# Patient Record
Sex: FEMALE | Race: BLACK OR AFRICAN AMERICAN | Employment: FULL TIME | URBAN - METROPOLITAN AREA
[De-identification: names, ages, dates, MRNs, and addresses within clinical notes are randomized per-mention and may not be internally consistent; named-entity substitution may affect disease eponyms.]

---

## 2022-04-20 ENCOUNTER — OFFICE VISIT (OUTPATIENT)
Dept: PRIMARY CARE CLINIC | Age: 32
End: 2022-04-20
Payer: MEDICAID

## 2022-04-20 VITALS
TEMPERATURE: 97.4 F | OXYGEN SATURATION: 98 % | HEIGHT: 63 IN | WEIGHT: 293 LBS | RESPIRATION RATE: 20 BRPM | BODY MASS INDEX: 51.91 KG/M2

## 2022-04-20 DIAGNOSIS — I10 PRIMARY HYPERTENSION: Primary | ICD-10-CM

## 2022-04-20 DIAGNOSIS — N92.1 METRORRHAGIA: ICD-10-CM

## 2022-04-20 DIAGNOSIS — H91.93 BILATERAL HEARING LOSS, UNSPECIFIED HEARING LOSS TYPE: ICD-10-CM

## 2022-04-20 DIAGNOSIS — D21.9 FIBROIDS: ICD-10-CM

## 2022-04-20 DIAGNOSIS — F33.41 RECURRENT MAJOR DEPRESSIVE DISORDER, IN PARTIAL REMISSION (HCC): ICD-10-CM

## 2022-04-20 DIAGNOSIS — Z86.2 HISTORY OF ANEMIA: ICD-10-CM

## 2022-04-20 PROCEDURE — 99204 OFFICE O/P NEW MOD 45 MIN: CPT | Performed by: FAMILY MEDICINE

## 2022-04-20 RX ORDER — VALSARTAN 80 MG/1
80 TABLET ORAL DAILY
COMMUNITY
Start: 2022-03-20 | End: 2022-06-28 | Stop reason: SDUPTHER

## 2022-04-20 RX ORDER — SERTRALINE HYDROCHLORIDE 100 MG/1
100 TABLET, FILM COATED ORAL DAILY
COMMUNITY
Start: 2022-03-20 | End: 2022-04-20 | Stop reason: SDUPTHER

## 2022-04-20 RX ORDER — AMLODIPINE BESYLATE 5 MG/1
5 TABLET ORAL DAILY
COMMUNITY
Start: 2022-03-20 | End: 2022-06-22

## 2022-04-20 RX ORDER — SERTRALINE HYDROCHLORIDE 100 MG/1
150 TABLET, FILM COATED ORAL DAILY
Qty: 45 TABLET | Refills: 0 | Status: SHIPPED | OUTPATIENT
Start: 2022-04-20 | End: 2022-07-06 | Stop reason: ALTCHOICE

## 2022-04-20 NOTE — PROGRESS NOTES
HPI     Chief Complaint:   Chief Complaint   Patient presents with   [address]       Merced Monaco is an 33 y.o. female. Patient was previously receiving care at: Dr. Shama Forbes history significant for:   Patient Active Problem List   Diagnosis Code    HTN (hypertension) I10    Depression F32. A    Fibroids D21.9    History of anemia Z86.2    Metrorrhagia N92.1       Concerns for today's visit:    HTN: Compliant with meds. Denies chest pain, palpitations. She was previously on HCTZ but it caused increased urination, which was cumbersome given her job at that time so she now is on valsartan 80mg and amlodipine 5mg and tolerates well. Denies chest pain/palpitations. Depression: \"it's alright\". Sees Russell Huggins with [facility] for therapy. She does not feel zoloft is working as well as it was before. Had SI last year while grieving multiple family deaths. No HI/SI currently. She feels her mood is often flat. Hearing loss: unclear etiology, denies trauma. Patient wears hearing aid on the left. Anemia: she says she's been on iron intermittently over the years. Anemia fluctuates. She suffers with metrorrhagia. Social History - reviewed:  Social History     Tobacco Use    Smoking status: Never Smoker    Smokeless tobacco: Never Used   Vaping Use    Vaping Use: Never used   Substance Use Topics    Alcohol use: Not Currently    Drug use: Never       Family History - reviewed:  History reviewed. No pertinent family history. Current Outpatient Medications   Medication Sig    amLODIPine (NORVASC) 5 mg tablet Take 5 mg by mouth daily.  valsartan (DIOVAN) 80 mg tablet Take 80 mg by mouth daily.  sertraline (ZOLOFT) 100 mg tablet Take 1.5 Tablets by mouth daily.  hydrochlorothiazide (HYDRODIURIL) 25 mg tablet Take 25 mg by mouth daily. (Patient not taking: Reported on 4/20/2022)     No current facility-administered medications for this visit.        Allergies - reviewed:   No Known Allergies    Past Medical History - reviewed:  Past Medical History:   Diagnosis Date    Depression     Hypertension        Past Surgical History - reviewed:  History reviewed. No pertinent surgical history. Immunizations - reviewed:   Immunization History   Administered Date(s) Administered    COVID-19, Pfizer Purple top, DILUTE for use, 12+ yrs, 30mcg/0.3mL dose 09/20/2021, 10/11/2021       Review of Systems   Review of Systems   Constitutional: Negative for fever. HENT: Positive for hearing loss. Negative for ear pain. Respiratory: Negative for cough and shortness of breath. Cardiovascular: Negative for chest pain and palpitations. Endo/Heme/Allergies: Negative for polydipsia. Does not bruise/bleed easily. Psychiatric/Behavioral: Positive for depression. Negative for substance abuse. The patient does not have insomnia. Reviewed PmHx, FmHx, SocHx as well as meds and allergies, updated and dated in the chart. Objective     Visit Vitals  Temp 97.4 °F (36.3 °C) (Temporal)   Resp 20   Ht 5' 3\" (1.6 m)   Wt 314 lb 12.8 oz (142.8 kg)   SpO2 98%   BMI 55.76 kg/m²       Physical Exam  Vitals and nursing note reviewed. Constitutional:       General: She is not in acute distress. Appearance: She is obese. HENT:      Ears:      Comments: Left hearing aid in place. Eyes:      Extraocular Movements: Extraocular movements intact. Conjunctiva/sclera: Conjunctivae normal.   Cardiovascular:      Rate and Rhythm: Normal rate and regular rhythm. Pulmonary:      Effort: Pulmonary effort is normal. No respiratory distress. Breath sounds: Normal breath sounds. Neurological:      General: No focal deficit present. Mental Status: She is alert and oriented to person, place, and time. Assessment and Plan     Diagnoses and all orders for this visit:    1. Primary hypertension  Comments:  Continue current regimen.  Lab check at next visit, along with physical.    2. Recurrent major depressive disorder, in partial remission ([facility] [address]  Comments:  Increasing zoloft to 150mg daily. Continue therapy. Warning signs reviewed. Orders:  -     sertraline (ZOLOFT) 100 mg tablet; Take 1.5 Tablets by mouth daily. 3. Fibroids    4. Metrorrhagia    5. History of anemia  Comments:  Possibly 2/2 fibroids. Need outside records from [facility]. Lab and physical next visit. 6. Bilateral hearing loss, unspecified hearing loss type  Comments:  Does well with hearing aids. Follow-up and Dispositions    · Return in about 1 month (around 5/20/2022) for annual physical.  Routing History         I discussed the aforementioned diagnoses with the patient as well as the plan of care. All questions were answered and an AVS was provided.        Celestina Madison MD  [facility]  [place] [address] [place] [address], [address] [place] [address]

## 2022-04-20 NOTE — PROGRESS NOTES
1. \"Have you been to the ER, urgent care clinic since your last visit? Hospitalized since your last visit? \" No    2. \"Have you seen or consulted any other health care providers outside of the [address] [place] [address] since your last visit? \" No     3. For patients aged 39-70: Has the patient had a colonoscopy / FIT/ Cologuard? NA - based on age      If the patient is female:    4. For patients aged 41-77: Has the patient had a mammogram within the past 2 years? NA - based on age or sex      11. For patients aged 21-65: Has the patient had a pap smear?  No  Visit Vitals  Temp 97.4 °F (36.3 °C) (Temporal)   Resp 20   Ht 5' 3\" (1.6 m)   Wt 314 lb 12.8 oz (142.8 kg)   SpO2 98%   BMI 55.76 kg/m²     Chief Complaint   Patient presents with   [address]

## 2022-05-19 NOTE — PROGRESS NOTES
HPI     Chief Complaint   Patient presents with    Physical     Pap/Breast exam       Imelda Bass is a 33 y.o. female presenting for well woman exam and is also due for follow up care of chronic issues. Imelda Bass is willing to do both appointments today and realizes that there may be a co-pay for the follow-up portion of the visit. She has a history of:  Patient Active Problem List   Diagnosis Code    Essential hypertension I10    Depression F32. A    Fibroids D21.9    History of anemia Z86.2    Metrorrhagia N92.1    Dysmenorrhea N94.6    PMDD (premenstrual dysphoric disorder) F32.81       Occupation: Photography/Videography  Dentist: [employer]    Concerns today:    Gyn History    She gets a monthly menstrual cycle. Cycles are heavy with a lot of cramping and associated with mood changes. Has been diagnosed with PMDD previously and currently is on Zoloft for depression which usually works really well other than increased mood changes during the PMDD. She does not smoke and denies hx of DVT. Diet and Exercise  Is starting to workout again. Family History  No FH of breast cancer. No FH of ovarian cancer. No FH of colon cancer. Allergies- reviewed  No Known Allergies    Medications- reviewed  Current Outpatient Medications   Medication Sig    norgestimate-ethinyl estradioL (ORTHO-CYCLEN, SPRINTEC) 0.25-35 mg-mcg tab Take 1 Tablet by mouth daily.  amLODIPine (NORVASC) 5 mg tablet Take 5 mg by mouth daily.  valsartan (DIOVAN) 80 mg tablet Take 80 mg by mouth daily.  sertraline (ZOLOFT) 100 mg tablet Take 1.5 Tablets by mouth daily. No current facility-administered medications for this visit. Immunizations - reviewed:   Immunization History   Administered Date(s) Administered    COVID-19, Pfizer Purple top, DILUTE for use, 12+ yrs, 30mcg/0.3mL dose 2021, 10/11/2021     Flu: recommended every fall.      Review of Systems   Review of Systems   Constitutional: Negative for chills and fever. HENT: Negative for congestion and ear pain. Eyes: Negative for discharge and redness. Respiratory: Negative for cough and shortness of breath. Cardiovascular: Negative for chest pain and palpitations. Gastrointestinal: Negative for abdominal pain and blood in stool. Musculoskeletal: Negative for falls and myalgias. Skin: Negative for itching and rash. Neurological: Negative for focal weakness and headaches. Endo/Heme/Allergies: Negative for polydipsia. Does not bruise/bleed easily. Psychiatric/Behavioral: Positive for depression. Negative for hallucinations, substance abuse and suicidal ideas. The patient does not have insomnia. Reviewed PmHx, FmHx, SocHx as well as meds and allergies, updated and dated in the chart. Social History     Tobacco Use    Smoking status: Never Smoker    Smokeless tobacco: Never Used   Vaping Use    Vaping Use: Never used   Substance Use Topics    Alcohol use: Not Currently    Drug use: Never     Past Medical History:   Diagnosis Date    Depression     Hypertension      History reviewed. No pertinent family history. Objective     Visit Vitals  /80 (BP 1 Location: Left upper arm, BP Patient Position: Sitting, BP Cuff Size: Large adult)   Pulse 74   Temp 97.4 °F (36.3 °C) (Temporal)   Resp 20   Ht 5' 3\" (1.6 m)   Wt 315 lb 6.4 oz (143.1 kg)   LMP 05/09/2022   SpO2 98%   BMI 55.87 kg/m²       Physical Exam  Vitals reviewed. Constitutional:       General: She is not in acute distress. Appearance: Normal appearance. HENT:      Head: Normocephalic and atraumatic. Right Ear: Tympanic membrane and external ear normal.      Left Ear: Tympanic membrane and external ear normal.      Nose: Nose normal. No rhinorrhea. Mouth/Throat:      Mouth: Mucous membranes are moist.      Pharynx: Oropharynx is clear. No oropharyngeal exudate. Eyes:      Extraocular Movements: Extraocular movements intact. Conjunctiva/sclera: Conjunctivae normal.      Pupils: Pupils are equal, round, and reactive to light. Cardiovascular:      Rate and Rhythm: Normal rate and regular rhythm. Heart sounds: No murmur heard. Pulmonary:      Effort: Pulmonary effort is normal. No respiratory distress. Breath sounds: Normal breath sounds. No rales. Abdominal:      General: Abdomen is flat. There is no distension. Palpations: Abdomen is soft. Genitourinary:     Labia:         Right: No rash. Left: No rash. Musculoskeletal:         General: No swelling or deformity. Normal range of motion. Cervical back: Normal range of motion and neck supple. Skin:     General: Skin is warm. Capillary Refill: Capillary refill takes less than 2 seconds. Findings: No rash. Neurological:      General: No focal deficit present. Mental Status: She is alert and oriented to person, place, and time. Mental status is at baseline. Psychiatric:         Mood and Affect: Mood normal.         Behavior: Behavior normal.       Exam chaperoned by Rosalind Saenz LPN  Breast -breasts appear normal, no suspicious masses, no skin or nipple changes or axillary nodes . Pelvic - Unable to complete due to significant discomfort, unable to insert speculum fully and patient on menstrual cycle. External genitalia normal without rashes or lesions. Pink and moist vaginal mucosa with menstrual blood visualized. Assessment and Plan     Diagnoses and all orders for this visit:    1. Well woman exam with routine gynecological exam  Comments:  Age-appropriate guidance given and HM updated. Orders:  -     CBC W/O DIFF  -     METABOLIC PANEL, COMPREHENSIVE  -     TSH RFX ON ABNORMAL TO FREE T4  -     LIPID PANEL  -     OBTAINING SCREEN PAP SMEAR  -     PAP IG, APTIMA HPV AND RFX 16/18,45 ([id number])  -     HEPATITIS C AB  -     norgestimate-ethinyl estradioL (ORTHO-CYCLEN, SPRINTEC) 0.25-35 mg-mcg tab;  Take 1 Tablet by mouth daily.    2. Recurrent major depressive disorder, in partial remission ([facility] [address]  Comments:  Continue zoloft. 3. PMDD (premenstrual dysphoric disorder)  Comments: Will do trial of OCPs and follow up regarding mood within 6 weeks. 4. Morbid obesity with BMI of 50.0-59.9, adult [facility])  Comments:  Referring to dietician. Encouraged moderate intensity exercise 30 minutes a day several times a week. Orders:  -     CBC W/O DIFF  -     METABOLIC PANEL, COMPREHENSIVE  -     TSH RFX ON ABNORMAL TO FREE T4  -     REFERRAL TO DIETITIAN    5. Fibroids  Comments:  Referred to Dr. Tiburcio Mojica for follow up. Orders:  -     REFERRAL TO OBSTETRICS AND GYNECOLOGY    6. Dysmenorrhea  -     REFERRAL TO OBSTETRICS AND GYNECOLOGY    7. Essential hypertension  Comments: At goal. Continue current regimen. 8. Need for hepatitis C screening test  -     HEPATITIS C AB    9. Attention deficit hyperactivity disorder (ADHD), unspecified ADHD type  Comments: We discussed straterra. We will wait on adding multiple meds now. She will see how she does on OCPs with mood. Could switch to wellbutrin for ADHD & Deppresion. I discussed the aforementioned diagnoses with the patient as well as the plan of care. All questions were answered and an AVS was provided.        Princess Lucas MD  [address] [place] [address]

## 2022-05-20 ENCOUNTER — OFFICE VISIT (OUTPATIENT)
Dept: PRIMARY CARE CLINIC | Age: 32
End: 2022-05-20
Payer: COMMERCIAL

## 2022-05-20 VITALS
DIASTOLIC BLOOD PRESSURE: 80 MMHG | OXYGEN SATURATION: 98 % | TEMPERATURE: 97.4 F | SYSTOLIC BLOOD PRESSURE: 130 MMHG | RESPIRATION RATE: 20 BRPM | HEART RATE: 74 BPM | BODY MASS INDEX: 51.91 KG/M2 | WEIGHT: 293 LBS | HEIGHT: 63 IN

## 2022-05-20 DIAGNOSIS — F90.9 ATTENTION DEFICIT HYPERACTIVITY DISORDER (ADHD), UNSPECIFIED ADHD TYPE: ICD-10-CM

## 2022-05-20 DIAGNOSIS — Z11.59 NEED FOR HEPATITIS C SCREENING TEST: ICD-10-CM

## 2022-05-20 DIAGNOSIS — Z01.419 WELL WOMAN EXAM WITH ROUTINE GYNECOLOGICAL EXAM: Primary | ICD-10-CM

## 2022-05-20 DIAGNOSIS — E66.01 MORBID OBESITY WITH BMI OF 50.0-59.9, ADULT (HCC): ICD-10-CM

## 2022-05-20 DIAGNOSIS — F32.81 PMDD (PREMENSTRUAL DYSPHORIC DISORDER): ICD-10-CM

## 2022-05-20 DIAGNOSIS — F33.41 RECURRENT MAJOR DEPRESSIVE DISORDER, IN PARTIAL REMISSION (HCC): ICD-10-CM

## 2022-05-20 DIAGNOSIS — N94.6 DYSMENORRHEA: ICD-10-CM

## 2022-05-20 DIAGNOSIS — D21.9 FIBROIDS: ICD-10-CM

## 2022-05-20 DIAGNOSIS — I10 ESSENTIAL HYPERTENSION: ICD-10-CM

## 2022-05-20 PROCEDURE — 99395 PREV VISIT EST AGE 18-39: CPT | Performed by: FAMILY MEDICINE

## 2022-05-20 PROCEDURE — 99214 OFFICE O/P EST MOD 30 MIN: CPT | Performed by: FAMILY MEDICINE

## 2022-05-20 RX ORDER — NORGESTIMATE AND ETHINYL ESTRADIOL 0.25-0.035
1 KIT ORAL DAILY
Qty: 1 DOSE PACK | Refills: 11 | Status: SHIPPED | OUTPATIENT
Start: 2022-05-20 | End: 2022-07-25 | Stop reason: SDUPTHER

## 2022-05-20 NOTE — PATIENT INSTRUCTIONS
WEIGHT LOSS TIPS  1. Read food labels and count calories. To lose a pound in a week, you need to decrease your daily calorie intake by about 500 calories - that's one less dessert or sweet beverage and a bag of chips every day. Some helpful online tools are:  Www.loseit. com   Www.eSharesfitnesspal.com   Weight watchers mobile      2. Increase water intake. Water keeps your hydrated and is important for your bodily functions. It also has zero calories. It is healthier for you than a sweet tea or soda. 3. Decrease sugary beverages (soda, energy drinks, fruit juices). Each can or glass of soda increases your risk of obesity because it adds empty calories. Cutting out sodas and other sugary beverages such as sweetened juices, is an easy way to eliminate unnecessary calories in your diet. 12 oz can of soda = 140 calories, 16 oz cup of sweet tea = 200 calories   16 oz orange juice = 200 calories, 10 oz apple juice = 150 calories   32 oz sports drink = 200 calories, 16 oz punch = 240 calories   3.5 oz alcohol = 100-150 calories      4. [employer] your own meals. Avoid fast food. These types of foods are dense in calories. 5. Exercise 30 minutes daily 5 days weekly, minimally. If you burn 3500 calories a week that equals a pound! Use a pedometer to count steps. Visit www. [employer] for a free pedometer and diet recommendations. 6. Decrease carbohydrates (white bread, pasta, rice, potatoes, sweet foods and sweet drinks like soda, tea, coffee, juice and sports drinks). Increase fiber and lean protein. 7. Eat 3-5 small meals daily. (Breakfast, lunch, dinner with 2 healthy snacks). 8. Get proper rest 7-8 hours uninterrupted. When you get less than 6 hours, it triggers hunger by affecting your Grehlin:Leptin ratio and this results in weight gain. 9. Watch your food portions. Green leafy vegetables should cover 1/2 of the plate, lean meat 1/4 of the plate, starchy vegetable 1/4 of the plate.  Use smaller plates! 10. Do not eat until you are full. Eat until you are no longer hungry. If you are not sure, try drinking a glass of water before getting your second serving of food. 11. Weigh yourself often. Don't worry about mild fluctuation (5 lbs or so) but keep an eye on general trends. Weighing yourself at least a few times a week helps you remain accountable and aware. 12. Set realistic, appropriate and achievable weight loss goals: You didn't gain it overnight, it's going to take time to lose it. Slow progress is still progress! RECOMMENDED TARGET WEIGHT LOSS: Initial weight loss of 5-10% of your initial body weight achieved over 6 months or a decrease of 2 BMI units. MINIMUM GOAL OF WEIGHT LOSS: Reduce body weight and maintain a lower body weight. Prevent weight gain.

## 2022-05-20 NOTE — PROGRESS NOTES
1. \"Have you been to the ER, urgent care clinic since your last visit? Hospitalized since your last visit? \" No    2. \"Have you seen or consulted any other health care providers outside of the [address] [place] [address] since your last visit? \" No     3. For patients aged 39-70: Has the patient had a colonoscopy / FIT/ Cologuard? NA - based on age      If the patient is female:    4. For patients aged 41-77: Has the patient had a mammogram within the past 2 years? NA - based on age or sex      11. For patients aged 21-65: Has the patient had a pap smear?  No  Visit Vitals  /80 (BP 1 Location: Left upper arm, BP Patient Position: Sitting, BP Cuff Size: Large adult)   Pulse 74   Temp 97.4 °F (36.3 °C) (Temporal)   Resp 20   Ht 5' 3\" (1.6 m)   Wt 315 lb 6.4 oz (143.1 kg)   LMP 05/09/2022   SpO2 98%   BMI 55.87 kg/m²     Chief Complaint   Patient presents with    Physical     Pap/Breast exam

## 2022-05-21 LAB
ALBUMIN SERPL-MCNC: 4.3 G/DL (ref 3.8–4.8)
ALBUMIN/GLOB SERPL: 1.5 {RATIO} (ref 1.2–2.2)
ALP SERPL-CCNC: 91 IU/L (ref 44–121)
ALT SERPL-CCNC: 20 IU/L (ref 0–32)
AST SERPL-CCNC: 20 IU/L (ref 0–40)
BILIRUB SERPL-MCNC: <0.2 MG/DL (ref 0–1.2)
BUN SERPL-MCNC: 14 MG/DL (ref 6–20)
BUN/CREAT SERPL: 17 (ref 9–23)
CALCIUM SERPL-MCNC: 10.3 MG/DL (ref 8.7–10.2)
CHLORIDE SERPL-SCNC: 99 MMOL/L (ref 96–106)
CHOLEST SERPL-MCNC: 183 MG/DL (ref 100–199)
CO2 SERPL-SCNC: 24 MMOL/L (ref 20–29)
CREAT SERPL-MCNC: 0.82 MG/DL (ref 0.57–1)
EGFR: 97 ML/MIN/1.73
ERYTHROCYTE [DISTWIDTH] IN BLOOD BY AUTOMATED COUNT: 14.9 % (ref 11.7–15.4)
GLOBULIN SER CALC-MCNC: 2.9 G/DL (ref 1.5–4.5)
GLUCOSE SERPL-MCNC: 95 MG/DL (ref 65–99)
HCT VFR BLD AUTO: 37.1 % (ref 34–46.6)
HCV AB S/CO SERPL IA: <0.1 S/CO RATIO (ref 0–0.9)
HDLC SERPL-MCNC: 55 MG/DL
HGB BLD-MCNC: 11.3 G/DL (ref 11.1–15.9)
LDLC SERPL CALC-MCNC: 109 MG/DL (ref 0–99)
MCH RBC QN AUTO: 24.2 PG (ref 26.6–33)
MCHC RBC AUTO-ENTMCNC: 30.5 G/DL (ref 31.5–35.7)
MCV RBC AUTO: 80 FL (ref 79–97)
PLATELET # BLD AUTO: 394 X10E3/UL (ref 150–450)
POTASSIUM SERPL-SCNC: 4 MMOL/L (ref 3.5–5.2)
PROT SERPL-MCNC: 7.2 G/DL (ref 6–8.5)
RBC # BLD AUTO: 4.66 X10E6/UL (ref 3.77–5.28)
SODIUM SERPL-SCNC: 138 MMOL/L (ref 134–144)
TRIGL SERPL-MCNC: 106 MG/DL (ref 0–149)
TSH SERPL DL<=0.005 MIU/L-ACNC: 4.43 UIU/ML (ref 0.45–4.5)
VLDLC SERPL CALC-MCNC: 19 MG/DL (ref 5–40)
WBC # BLD AUTO: 9.1 X10E3/UL (ref 3.4–10.8)

## 2022-05-24 NOTE — PROGRESS NOTES
[employer] message sent:  Good morning Brittney,    Your labs have returned. Your blood counts, kidney function, liver function, electrolytes, and thyroid function are within acceptable limits. Your LDL, the \"bad cholesterol\", is slightly elevated. Let's work on healthier lifestyle, including cutting back on red meat, pork, fried/processed foods. I also recommend 30 minutes of moderate intensity exercise most days of the week. Your hepatitis C screening was negative. Please do not hesitate to reach out with any questions. Thank you for the privilege to participate in your healthcare.     Dr. BEASLEY [facility]

## 2022-05-25 DIAGNOSIS — Z76.89 ENCOUNTER FOR WEIGHT MANAGEMENT: Primary | ICD-10-CM

## 2022-05-25 DIAGNOSIS — E66.01 MORBID OBESITY (HCC): ICD-10-CM

## 2022-06-07 ENCOUNTER — OFFICE VISIT (OUTPATIENT)
Dept: SURGERY | Age: 32
End: 2022-06-07
Payer: MEDICAID

## 2022-06-07 VITALS
HEIGHT: 63 IN | HEART RATE: 81 BPM | BODY MASS INDEX: 51.91 KG/M2 | DIASTOLIC BLOOD PRESSURE: 86 MMHG | WEIGHT: 293 LBS | SYSTOLIC BLOOD PRESSURE: 152 MMHG | RESPIRATION RATE: 20 BRPM | OXYGEN SATURATION: 98 %

## 2022-06-07 DIAGNOSIS — G47.8 UNREFRESHED BY SLEEP: ICD-10-CM

## 2022-06-07 DIAGNOSIS — R40.0 DAYTIME SOMNOLENCE: ICD-10-CM

## 2022-06-07 DIAGNOSIS — R06.83 SNORING: ICD-10-CM

## 2022-06-07 DIAGNOSIS — Z13.1 SCREENING FOR DIABETES MELLITUS: ICD-10-CM

## 2022-06-07 DIAGNOSIS — R51.9 FREQUENT HEADACHES: ICD-10-CM

## 2022-06-07 DIAGNOSIS — Z13.6 SCREENING FOR ISCHEMIC HEART DISEASE: ICD-10-CM

## 2022-06-07 DIAGNOSIS — E66.01 OBESITY, MORBID, BMI 50 OR HIGHER (HCC): Primary | ICD-10-CM

## 2022-06-07 DIAGNOSIS — I10 ESSENTIAL HYPERTENSION: ICD-10-CM

## 2022-06-07 PROCEDURE — 99214 OFFICE O/P EST MOD 30 MIN: CPT | Performed by: FAMILY MEDICINE

## 2022-06-07 PROCEDURE — 93000 ELECTROCARDIOGRAM COMPLETE: CPT | Performed by: FAMILY MEDICINE

## 2022-06-07 RX ORDER — CHOLECALCIFEROL TAB 125 MCG (5000 UNIT) 125 MCG
5000 TAB ORAL DAILY
COMMUNITY

## 2022-06-07 RX ORDER — MENTHOL
1000 GEL (GRAM) TOPICAL DAILY
COMMUNITY
End: 2022-07-25

## 2022-06-07 RX ORDER — GLUCOSAMINE SULFATE 500 MG
1 TABLET ORAL DAILY
COMMUNITY

## 2022-06-07 RX ORDER — LANOLIN ALCOHOL/MO/W.PET/CERES
1000 CREAM (GRAM) TOPICAL DAILY
COMMUNITY

## 2022-06-07 RX ORDER — LANOLIN ALCOHOL/MO/W.PET/CERES
400 CREAM (GRAM) TOPICAL DAILY
COMMUNITY

## 2022-06-07 RX ORDER — LANOLIN ALCOHOL/MO/W.PET/CERES
325 CREAM (GRAM) TOPICAL
COMMUNITY

## 2022-06-07 NOTE — PROGRESS NOTES
1. Have you been to the ER, urgent care clinic since your last visit? Hospitalized since your last visit? No    2. Have you seen or consulted any other health care providers outside of the [address] [place] [address] since your last visit? Include any pap smears or colon screening. No     Pt blood pressure is high. Pt indicates she took her BP at 11am today. Pt denies chest pain, arm pain, blurry vision, dizziness, weakness and swelling legs. Writer has reported pt's blood pressure to Dr Paul Maya.

## 2022-06-07 NOTE — PROGRESS NOTES
[facility] Progress Note: Initial Physician Visit     Richa Teran is a 33 y.o. female who is here for medical screening for entering the [facility].   The patient denies any disease state that requires protein restriction. CC: class 3  Obesity    Referred by Dr Jourdan Lagunas reason referred  Blood pressure, overweight her whole life    Weight History  I personally reviewed the Medical Screening Lutricia Carrizozo completed by patient and scanned into media section of chart. It includes duration of their obesity, maximum weight, goal weight  all of which give the context of their obesity AND a Family History of their obesity. Weight loss History  I personally reviewed the Medical Screening Lutricia Carrizozo completed by the patient and scanned into media section of chart. It includes number of weight loss attempts, the weight loss program that patients was most successful using, and if they have any hx of anorectic medication use, including OTC supplements. Significant Medical History  Past Medical History:   Diagnosis Date    Depression     Hypertension      Patient's medicactions that may contribute: zoloft  Plan to become pregant within 6 months     I personally reviewed the Medical Screening Lutricia Carrizozo completed by the patient and scanned into media section of chart. This allows me to assess associated symptoms that are significant in the assessment of the patient's obesity and the patient's Past Medical History. Outpatient Medications Marked as Taking for the 6/7/22 encounter (Office Visit) with Liana Brooks MD   Medication Sig Dispense Refill    cholecalciferol (Vitamin D3) (5000 Units/125 mcg) tab tablet Take  by mouth daily.  cyanocobalamin (Vitamin B-12) 1,000 mcg tablet Take 1,000 mcg by mouth daily.  magnesium oxide (MAG-OX) 400 mg tablet Take 400 mg by mouth daily.  Omega-3 Fatty Acids 60- mg cpDR Take  by mouth.       zinc 50 mg tab tablet Take  by mouth daily.  vitamin e (E GEMS) 1,000 unit capsule Take 1,000 Units by mouth daily.  ferrous sulfate (Iron) 325 mg (65 mg iron) tablet Take  by mouth Daily (before breakfast).  lysine 1,000 mg tab Take  by mouth.  norgestimate-ethinyl estradioL (ORTHO-CYCLEN, SPRINTEC) 0.25-35 mg-mcg tab Take 1 Tablet by mouth daily. 1 Dose Pack 11    amLODIPine (NORVASC) 5 mg tablet Take 5 mg by mouth daily.  valsartan (DIOVAN) 80 mg tablet Take 80 mg by mouth daily.  sertraline (ZOLOFT) 100 mg tablet Take 1.5 Tablets by mouth daily. 45 Tablet 0       AVG Hours SLEEP: poor quality    ISABEL snoring, wakes wit headaches, unrefreshed by sleep,  Daytime somnolence   CPAP no    Significant Psychosocial History   Has a doctor every diagnosed with Binge Eating Disorder, Bulemia or Anorexia? : no     Compliance  Upcoming Travel? no    Social History  Social History     Tobacco Use    Smoking status: Never Smoker    Smokeless tobacco: Never Used   Substance Use Topics    Alcohol use: Not Currently       Exercise  I personally reviewed the Medical Screening Chasidy Chawla completed by the patient and scanned into media section of chart.   MINUTES 60 and  2-3 times a week the last 3 weeks  Started  Kick boxing class a few months ago but has not been consistent    Review of Systems  See HPI        Objective  Visit Vitals  BP (!) 152/86 (BP 1 Location: Left arm, BP Patient Position: Sitting, BP Cuff Size: Thigh)   Pulse 81   Resp 20   Ht 5' 3\" (1.6 m)   Wt 311 lb 3.2 oz (141.2 kg)   LMP 05/09/2022   SpO2 98%   BMI 55.13 kg/m²         Weight Metrics 6/13/2022 6/7/2022 6/7/2022 5/20/2022 4/20/2022 4/25/2012   Weight 304 lb 1.6 oz - 311 lb 3.2 oz 315 lb 6.4 oz 314 lb 12.8 oz 275 lb   Neck Circ (inches) - 17.5 - - - -   Waist Measure Inches - 54.5 - - - -   Body Fat % - 49.6 - - - -   BMI 53.87 kg/m2 - 55.13 kg/m2 55.87 kg/m2 55.76 kg/m2 48.73 kg/m2       Labs: See  labs scanned into Media section or in lab section of record      Physical Exam    Vital Signs Reviewed  Weight Management Metrics Reviewed    Appearance: well  HEENT:  Scleral icterus?  no  Neck:  Thyromegaly or nodules? no  Mouth: Large tongue no  Heart:  RRR  Lungs:  clear  Abdomen:     Hepatomegaly? no   Striae present? no  Skin:    Acne?  no   Hirsutism? no   Skin tags? yes   Acanthosis Nigricans? yes  Ext:  Edema? no      Assessment & Plan  Encounter Diagnoses   Name Primary?  Obesity, morbid, BMI 50 or higher ([address] Yes    Snoring     Unrefreshed by sleep     Frequent headaches     Daytime somnolence     Screening for ischemic heart disease     Screening for diabetes mellitus        1. labs reviewed w/ patient  2. EKG reviewed w/ patient:   Personally reviewed by me, NSR, No abnormalities,    QTc = 412 sec (Upper limit is 440 for males & 460 for females)  Nonspecific t wave changes  Because she is so young and tolerating kick boxing with no chest pain  these changes are likely not a true problem  3. Medication changes include: none  Diagnoses and all orders for this visit:    1. Obesity, morbid, BMI 50 or higher ([facility] [address]  -     AMB POC EKG ROUTINE W/ 12 LEADS, INTER & REP  She opts for the LCD plan  She will start right away   make appt w dietitian for asap    2. Snoring  -     SLEEP MEDICINE REFERRAL  Has signs of ISABEL. The elevated BP may be due to this   Get sleep study asap  3. Essential hypertension  Cont the same meds, amlodipine 5 mg every day and valsartan 80 mg qd  No change in dose   Hoping to eventually get off of both  4. Unrefreshed by sleep  -     SLEEP MEDICINE REFERRAL    5. Frequent headaches  -     SLEEP MEDICINE REFERRAL    5. Daytime somnolence  -     SLEEP MEDICINE REFERRAL    6. Screening for ischemic heart disease  -     AMB POC EKG ROUTINE W/ 12 LEADS, INTER & REP    7. Screening for diabetes mellitus  -     HEMOGLOBIN A1C WITH EAG;  Future        Based on his history, labs and EKG, Amanda Gutierrez is a good candidate for the [facility]      time with Silvana Vides consisted of counseling & coordinating and/or discussing treatment plans in reference to her obesity The primary encounter diagnosis was Obesity, morbid, BMI 50 or higher ([facility] [address] Diagnoses of Snoring, Unrefreshed by sleep, Frequent headaches, Daytime somnolence, Screening for ischemic heart disease, and Screening for diabetes mellitus were also pertinent to this visit. denies pain/discomfort

## 2022-06-08 ENCOUNTER — TELEPHONE (OUTPATIENT)
Dept: SLEEP MEDICINE | Age: 32
End: 2022-06-08

## 2022-06-08 LAB
EST. AVERAGE GLUCOSE BLD GHB EST-MCNC: 103 MG/DL
HBA1C MFR BLD: 5.2 % (ref 4.8–5.6)

## 2022-06-08 NOTE — TELEPHONE ENCOUNTER
LEXA on 06/09/2022 at 9:03am returning patient's call into the office to schedule a New patient consult visit with Dr. Ne Adams.

## 2022-06-09 ENCOUNTER — CLINICAL SUPPORT (OUTPATIENT)
Dept: SURGERY | Age: 32
End: 2022-06-09

## 2022-06-09 DIAGNOSIS — E66.01 OBESITY, MORBID, BMI 50 OR HIGHER (HCC): Primary | ICD-10-CM

## 2022-06-09 NOTE — PROGRESS NOTES
[facility]  Metabolic Program Initial Nutrition Consult    Date: 2022   Physician: Karina Mares MD  Name: Tejas Broussard  :  1990    Type of Plan: VLCD  Weeks on Plan: day 2  Virtual visit was completed through [address] [place] [address]. ASSESSMENT:    Medications/Supplements:   Prior to Admission medications    Medication Sig Start Date End Date Taking? Authorizing Provider   cholecalciferol (Vitamin D3) (5000 Units/125 mcg) tab tablet Take  by mouth daily. Provider, Historical   cyanocobalamin (Vitamin B-12) 1,000 mcg tablet Take 1,000 mcg by mouth daily. Provider, Historical   magnesium oxide (MAG-OX) 400 mg tablet Take 400 mg by mouth daily. Provider, Historical   Omega-3 Fatty Acids 60- mg cpDR Take  by mouth. Provider, Historical   zinc 50 mg tab tablet Take  by mouth daily. Provider, Historical   vitamin e (E GEMS) 1,000 unit capsule Take 1,000 Units by mouth daily. Provider, Historical   ferrous sulfate (Iron) 325 mg (65 mg iron) tablet Take  by mouth Daily (before breakfast). Provider, Historical   lysine 1,000 mg tab Take  by mouth. Provider, Historical   norgestimate-ethinyl estradioL (ORTHO-CYCLEN, SPRINTEC) 0.25-35 mg-mcg tab Take 1 Tablet by mouth daily. 22   Roberto Carlos Leonardo MD   amLODIPine (NORVASC) 5 mg tablet Take 5 mg by mouth daily. 3/20/22   Provider, Historical   valsartan (DIOVAN) 80 mg tablet Take 80 mg by mouth daily. 3/20/22   Provider, Historical   sertraline (ZOLOFT) 100 mg tablet Take 1.5 Tablets by mouth daily. 22   Roberto Carlos Leonardo MD       Anthropometrics:    Ht:63\"   Wt: 311#    IBW: 115#    %IBW: 270%    BMI:55    Category: Obesity Class 3    Pt presents today for initial nutrition consult for the Reina Reyes.      Exercise/Physical Activity: gym but none this week. Walking dog daily.     Started meal replacements:yes  If yes, how many per day:3    Aversions/side effects to meal replacements: none    Reported Diet History:  2 ND shakes, 1 ND soup, and 1 bar. On menstrual cycle during this visit. Has had food outside meal plan because of this including cupcakes. Gingerale for nausea. Beverages: water up to 64 ounces but working on increasing, likes coffee but none since starting program.    Environment/Psychosocial/Support: admits to boredom eating. NUTRITION INTERVENTION:  Pt educated on nutrition recommendations for VLCD, specifically 4 ND products per day totaling 800 kcals, 40-50 grams carbs, 100+ grams protein, and remaining calories as healthy fats. Grocery meal:  Use the balanced plate method to plan meals, include 3-6 oz of lean source of protein, unlimited non-starchy vegetables, 1/2 cup whole grains/beans OR 1/2 cup fruit OR 1 serving of low fat dairy. Utilize handouts listing healthy snack and meal ideas. Read all nutrition labels. Demonstrated and emphasized identifying serving size, total fat, sugar and protein content. Defined low fat as </= 3 g per serving. Discussed lean and extra lean sources of protein. Provided list of low fat cooking methods. Avoid foods with sugar listed in the first 3 ingredients and >/10 g sugar per serving. Practice mindful eating habits; take small bites, chew thoroughly, avoid distractions, utilize hunger/fullness scale. Attend Metabolic Weight Loss Class and Support Group and increase physical activity (approved per MD) for long term weight maintenance. NUTRITION MONITORING AND EVALUATION: Reviewed VLCD manual, best tips, and safe use. Pt motivated, amicable to suggestions. Followup one month. The following goals were established with patient;  1) have 5 ND products on gym days  2) water  ounces water per day      Specific tips and techniques to facilitate compliance with above recommendations were provided and discussed. If further details are desired please contact me at [phone].   This phone number was also provided to the patient for any further questions or concerns.           Patricia Hess, MS, RD, LDN

## 2022-06-13 ENCOUNTER — TELEPHONE (OUTPATIENT)
Dept: PRIMARY CARE CLINIC | Age: 32
End: 2022-06-13

## 2022-06-13 ENCOUNTER — CLINICAL SUPPORT (OUTPATIENT)
Dept: SURGERY | Age: 32
End: 2022-06-13

## 2022-06-13 VITALS
WEIGHT: 293 LBS | OXYGEN SATURATION: 98 % | SYSTOLIC BLOOD PRESSURE: 117 MMHG | HEIGHT: 63 IN | TEMPERATURE: 98.2 F | HEART RATE: 74 BPM | BODY MASS INDEX: 51.91 KG/M2 | DIASTOLIC BLOOD PRESSURE: 81 MMHG | RESPIRATION RATE: 18 BRPM

## 2022-06-13 DIAGNOSIS — I10 ESSENTIAL HYPERTENSION: ICD-10-CM

## 2022-06-13 DIAGNOSIS — E66.01 OBESITY, MORBID, BMI 50 OR HIGHER (HCC): Primary | ICD-10-CM

## 2022-06-13 NOTE — PROGRESS NOTES
Weight Management. 1. Have you been to the ER, urgent care clinic since your last visit? Hospitalized since your last visit? No    2. Have you seen or consulted any other health care providers outside of the [address] [place] [address] since your last visit? Include any pap smears or colon screening. No       Progress Note: Weekly Education Class in the [facility]         Patient is on Very Low Calorie Diet [x] (4 meal replacements per day, 800 kcal/day)      Low Calorie Diet [] (2-3 meal replacements per day, 1239-1040 kcal/day)    1) Did patient have any new symptoms or physical problems? Yes [x]    No []    If yes, check & comment: weakness [x], fatigue [x], lightheadedness [x], headache [], cramps [x], cold intolerance [], hair loss [], diarrhea [x], constipation [],  NA [] other: Heavy period with  Non-stop cramps                                2) Has patient had any medical attention from other providers, urgent care or the emergency room this week? Yes []  No [x]       NA [], If yes, why:                                      3) Any other sugar sweetened beverages consumed this week? Yes [x]  No []    4) Did patient have any problems adhering to the diet? Yes [x]  No [] NA []    If yes, Vacation [], Celebrations [], Conferences [], Family Reunions [] other: Pain - I needed something   solid for thismedicine                                               5) How many hours of sleep this week? 4.5-7.5    (range)  NA []    Number of meal replacements consumed daily? 3-4  (range)  NA []    Average ounces of water patient consumed daily this week (not including shakes)? 37     (divide the weekly total by 7)    Did you eat any food outside of the program? Yes [x] No []    Physical Activity Over the Past Week:    Cardio exercise: 56 min  Strength exercise:1  workouts / week  Number of steps walked per day: 6201-6815    How has patient mood overall been this week?  Sad [], Happy [], Stressed [], Tired [], Content [], NA [], other   Several different moods             Medications reconciled by nurse Yes [x]  No[]    Patient was given therapeutic recommendations for any noted side effects of their dietary approach based upon [facility] patient manual per providers recommendation.

## 2022-06-13 NOTE — TELEPHONE ENCOUNTER
I started the weight management program this past week and I must say, this has been the worst period I've had ever. I guess my body is adjusting but I've been bleeding and cramping a lot. More than usual.      Could this be a combination of the birth control and diet change that's causing this? Ibuprofen is barely working. It's only been 5 days since I started but I'm usually done by now. It's just different. I've never cramped non-stop for days at a time like this.  :(

## 2022-06-22 ENCOUNTER — CLINICAL SUPPORT (OUTPATIENT)
Dept: SURGERY | Age: 32
End: 2022-06-22

## 2022-06-22 VITALS
RESPIRATION RATE: 18 BRPM | DIASTOLIC BLOOD PRESSURE: 82 MMHG | SYSTOLIC BLOOD PRESSURE: 146 MMHG | OXYGEN SATURATION: 99 % | TEMPERATURE: 99.8 F | BODY MASS INDEX: 51.91 KG/M2 | WEIGHT: 293 LBS | HEIGHT: 63 IN | HEART RATE: 78 BPM

## 2022-06-22 DIAGNOSIS — E66.01 OBESITY, MORBID, BMI 50 OR HIGHER (HCC): Primary | ICD-10-CM

## 2022-06-22 DIAGNOSIS — R06.83 SNORING: ICD-10-CM

## 2022-06-22 DIAGNOSIS — I10 ESSENTIAL HYPERTENSION: ICD-10-CM

## 2022-06-22 RX ORDER — AMLODIPINE BESYLATE 5 MG/1
TABLET ORAL
Qty: 90 TABLET | Refills: 1 | Status: SHIPPED | OUTPATIENT
Start: 2022-06-22

## 2022-06-22 NOTE — PROGRESS NOTES
1. Have you been to the ER, urgent care clinic since your last visit? Hospitalized since your last visit? No    2. Have you seen or consulted any other health care providers outside of the [address] [place] [address] since your last visit? Include any pap smears or colon screening. No     Pt states she has been out of her BP meds for a few days. Pt indicates she will send PCP a My Chart message to get a refill. Pt denies chest pain, arm pain, dizziness, weakness, blurry vision, headache, SOB and increased swelling.     06/19/2022  Progress Note: Weekly Education Class in the [facility] Weight Loss Program         Patient is on Very Low Calorie Diet [x] (4 meal replacements per day, 800 kcal/day)      Low Calorie Diet [] (2-3 meal replacements per day, 2750-8265 kcal/day)    1) Did patient have any new symptoms or physical problems? Yes [x]    No []    If yes, check & comment: weakness [x], fatigue [x], lightheadedness [], headache [x], cramps [x], cold intolerance [], hair loss [], diarrhea [], constipation [],  NA [] other:                 2) Has patient had any medical attention from other providers, urgent care or the emergency room this week? Yes []  No [x]       NA [], If yes, why:                    3) Any other sugar sweetened beverages consumed this week? Yes [x]  No []    4) Did patient have any problems adhering to the diet? Yes [x]  No [] NA []    If yes, Vacation [], Celebrations [], Conferences [], Family Reunions [] other:   Took too much medicine one day and got sick, I was very weak and tired after a very active day so I ate regular food                   5) How many hours of sleep this week? 5:30-8:30   (range)  NA []    Number of meal replacements consumed daily? 2-4 (range)  NA []    Average ounces of water patient consumed daily this week (not including shakes)?   97 (divide the weekly total by 7)    Did you eat any food outside of the program? Yes [x] No []    Physical Activity Over the Past Week:    Cardio exercise: n/a min  Strength exercise: n/a workouts / week  Number of steps walked per day: 5879-23264    How has patient mood overall been this week? Sad [], Happy [], Stressed [], Tired [], Content [], NA [], other   \"chill\"          Medications reconciled by nurse Yes [x]  No[]    Patient was given therapeutic recommendations for any noted side effects of their dietary approach based upon [facility] patient manual per providers recommendation.

## 2022-06-23 ENCOUNTER — OFFICE VISIT (OUTPATIENT)
Dept: SURGERY | Age: 32
End: 2022-06-23

## 2022-06-23 DIAGNOSIS — E66.01 OBESITY, MORBID, BMI 50 OR HIGHER (HCC): Primary | ICD-10-CM

## 2022-06-23 NOTE — PROGRESS NOTES
[facility]  Metabolic Weight Loss Program        Patient's Name: Amanda Gutierrez  : 1990    This patient is enrolled in [address] [place] [address] Weight Loss Program and attended the required weekly virtual nutrition class hosted via [address] [place] [address].       Lino Steel, MS, RD, LDN

## 2022-06-27 ENCOUNTER — OFFICE VISIT (OUTPATIENT)
Dept: OBGYN CLINIC | Age: 32
End: 2022-06-27
Payer: MEDICAID

## 2022-06-27 VITALS
HEIGHT: 63 IN | HEART RATE: 70 BPM | DIASTOLIC BLOOD PRESSURE: 82 MMHG | OXYGEN SATURATION: 97 % | SYSTOLIC BLOOD PRESSURE: 166 MMHG | BODY MASS INDEX: 51.91 KG/M2 | RESPIRATION RATE: 20 BRPM | TEMPERATURE: 96.9 F | WEIGHT: 293 LBS

## 2022-06-27 DIAGNOSIS — Z86.018 HISTORY OF UTERINE FIBROID: ICD-10-CM

## 2022-06-27 DIAGNOSIS — N92.0 MENORRHAGIA WITH REGULAR CYCLE: ICD-10-CM

## 2022-06-27 DIAGNOSIS — Z01.419 ENCOUNTER FOR GYNECOLOGICAL EXAMINATION WITHOUT ABNORMAL FINDING: Primary | ICD-10-CM

## 2022-06-27 DIAGNOSIS — Z12.39 ENCOUNTER FOR BREAST CANCER SCREENING USING NON-MAMMOGRAM MODALITY: ICD-10-CM

## 2022-06-27 DIAGNOSIS — Z87.42 HISTORY OF OVARIAN CYST: ICD-10-CM

## 2022-06-27 PROCEDURE — 99203 OFFICE O/P NEW LOW 30 MIN: CPT | Performed by: OBSTETRICS & GYNECOLOGY

## 2022-06-27 RX ORDER — HYDROCORTISONE 25 MG/G
CREAM TOPICAL AS NEEDED
COMMUNITY
Start: 2022-06-20

## 2022-06-27 NOTE — PROGRESS NOTES
Chief Complaint   Patient presents with    Annual Exam    Establish Care     1. \"Have you been to the ER, urgent care clinic since your last visit? Hospitalized since your last visit? \" No    2. \"Have you seen or consulted any other health care providers outside of the [address] [place] [address] since your last visit? \" No     3. For patients aged 39-70: Has the patient had a colonoscopy? NA - based on age     If the patient is female:    4. For patients aged 41-77: Has the patient had a mammogram within the past 2 years? NA - based on age    11. For patients aged 21-65: Has the patient had a pap smear?  Yes 2018 last pap smear patient states    Visit Vitals  BP (!) 166/82 (BP 1 Location: Left upper arm, BP Patient Position: Sitting, BP Cuff Size: Adult)   Pulse 70   Temp 96.9 °F (36.1 °C) (Temporal)   Resp 20   Ht 5' 3\" (1.6 m)   Wt 301 lb 1.6 oz (136.6 kg)   LMP 06/24/2022   SpO2 97%   BMI 53.34 kg/m²     Has high BP waiting on BP meds to be filled

## 2022-06-27 NOTE — PATIENT INSTRUCTIONS
Preventing Osteoporosis: Care Instructions  Your Care Instructions     Osteoporosis means the bones are weak and thin enough that they can break easily. The older you are, the more likely you are to get osteoporosis. But with plenty of calcium, vitamin D, and exercise, you can help prevent osteoporosis. The preteen and teen years are a key time for bone building. With the help of calcium, vitamin D, and exercise in those early years and beyond, the bones reach their peak density and strength by age 27. After age 27, your bones naturally start to thin and weaken. The stronger your bones are at around age 27, the lower your risk for osteoporosis. But no matter what your age and risk are, your bones still need calcium, vitamin D, and exercise to stay strong. Also avoid smoking, and limit alcohol. Smoking and heavy alcohol use can make your bones thinner. Talk to your doctor about any special risks you might have, such as having a close relative with osteoporosis or taking a medicine that can weaken bones. Your doctor can tell you the best ways to protect your bones from thinning. Follow-up care is a key part of your treatment and safety. Be sure to make and go to all appointments, and call your doctor if you are having problems. It's also a good idea to know your test results and keep a list of the medicines you take. How can you care for yourself at home? Here are some things you can do to build and strengthen your bones:  · Get enough calcium and vitamin D.  ? Eat foods rich in calcium, like yogurt, cheese, milk, and dark green vegetables. ? Eat foods rich in vitamin D, like eggs, fatty fish, cereal, and fortified milk. ? Get some sunshine. Your body uses sunshine to make its own vitamin D.  ? Talk to your doctor about taking a calcium plus vitamin D supplement if you don't think you are getting enough through your diet and sunshine. Get enough calcium and vitamin D.  The recommended daily allowances (RDAs) for adults younger than age 46 are 1,000 mg of calcium and 600 IU of vitamin D each day. Women ages 46 to 79 need 1,200 mg of calcium and 600 IU of vitamin D each day. Men ages 46 to 79 need 1,000 mg of calcium and 600 IU of vitamin D each day. Adults 71 and older need 1,200 mg of calcium and 800 IU of vitamin D each day. It's not clear if people who already have osteoporosis need more calcium and vitamin D than this. Talk to your doctor about what's right for you. Eat foods rich in calcium, like yogurt, cheese, milk, and dark green vegetables. This is a good way to get the calcium you need. You can get vitamin D from eggs, fatty fish, cereal, and milk. Ask your doctor if you need to take a calcium plus vitamin D supplement. You may be able to get enough calcium and vitamin D through your diet. Be careful with supplements. Adults ages 23 to 48 should not get more than 2,500 mg of calcium and 4,000 IU of vitamin D each day, whether it is from supplements and/or food. Adults ages 46 and older should not get more than 2,000 mg of calcium and 4,000 IU of vitamin D each day from supplements and/or food. · Get regular bone-building exercise. Walking, jogging, dancing, and lifting weights can make your bones stronger. · Limit alcohol to 2 drinks a day for men and 1 drink a day for women. · Don't smoke. Smoking can make bones thin faster. If you need help quitting, talk to your doctor about stop-smoking programs and medicines. These can increase your chances of quitting for good. When should you call for help? Watch closely for changes in your health, and be sure to contact your doctor if you have any problems. Where can you learn more? Go to http://hallie-marci.info/  Enter D245 in the search box to learn more about \"Preventing Osteoporosis: Care Instructions. \"  Current as of: September 8, 2021               Content Version: 13.2  © 1916-1793 Healthwise, [facility].    Care instructions adapted under license by [employer] (which disclaims liability or warranty for this information). If you have questions about a medical condition or this instruction, always ask your healthcare professional. [address] any warranty or liability for your use of this information.

## 2022-06-27 NOTE — PROGRESS NOTES
HPI: Radha Cervantes is a 33 y.o. female [de-identified], LMP 6/24/22, who presents today for the following:  Chief Complaint   Patient presents with    Annual Exam   Queenie Le Establish Care        She denies abnormal vaginal/vulvar pruritus; abnormal vaginal discharge or vaginal odor. H/o fibroids and h/o ovarian cyst. Last ultrasound was in 2018 or 2019. She has heavy menses. Her menses last 5 days. She cycles monthly. She recently started birth control pills in May 2022 for heavy menses. CBC and TSH wnl in May 2022 (in chart). H/o HTN, ran out of meds. /82 today but normally not as high. She has called PCP office to get it refilled. Denies h/o STIs or abnormal pap smears. Last mammogram: never. Last colonoscopy: never. Aware of potential problem visit billing. Past Medical History:   Diagnosis Date    Depression     Hypertension        History reviewed. No pertinent surgical history.     Family History   Problem Relation Age of Onset    Dementia Mother     Hypertension Mother     Throat cancer Father     Prostate Cancer Father         in remission    Diabetes Other     Breast Cancer Neg Hx     Ovarian Cancer Neg Hx     Colon Cancer Neg Hx        Social History     Socioeconomic History    Marital status: SINGLE     Spouse name: Not on file    Number of children: Not on file    Years of education: Not on file    Highest education level: Some college, no degree   Occupational History    Occupation: office work   Tobacco Use    Smoking status: Never Smoker    Smokeless tobacco: Never Used   Vaping Use    Vaping Use: Never used   Substance and Sexual Activity    Alcohol use: Not Currently    Drug use: Never    Sexual activity: Not Currently     Comment: denies h/o STIs or abnml pap smears   Other Topics Concern    Not on file   Social History Narrative    Not on file     Social Determinants of Health     Financial Resource Strain:     Difficulty of Paying Living Expenses: Not on file   Food Insecurity:     Worried About Running Out of Food in the Last Year: Not on file    Severiano of Food in the Last Year: Not on file   Transportation Needs:     Lack of Transportation (Medical): Not on file    Lack of Transportation (Non-Medical): Not on file   Physical Activity: Insufficiently Active    Days of Exercise per Week: 7 days    Minutes of Exercise per Session: 20 min   Stress:     Feeling of Stress : Not on file   Social Connections:     Frequency of Communication with Friends and Family: Not on file    Frequency of Social Gatherings with Friends and Family: Not on file    Attends Advent Services: Not on file    Active Member of [address] [place] [address] [employer] or Organizations: Not on file    Attends Club or Organization Meetings: Not on file    Marital Status: Not on file   Intimate Partner Violence:     Fear of Current or Ex-Partner: Not on file    Emotionally Abused: Not on file    Physically Abused: Not on file    Sexually Abused: Not on file   Housing Stability:     Unable to Pay for Housing in the Last Year: Not on file    Number of [place] in the Last Year: Not on file    Unstable Housing in the Last Year: Not on file       No Known Allergies       Current Outpatient Medications:     amLODIPine (NORVASC) 5 mg tablet, TAKE 1 TABLET BY MOUTH EVERY DAY, Disp: 90 Tablet, Rfl: 1    cholecalciferol (Vitamin D3) (5000 Units/125 mcg) tab tablet, Take  by mouth daily. , Disp: , Rfl:     cyanocobalamin (Vitamin B-12) 1,000 mcg tablet, Take 1,000 mcg by mouth daily. , Disp: , Rfl:     magnesium oxide (MAG-OX) 400 mg tablet, Take 400 mg by mouth daily. , Disp: , Rfl:     Omega-3 Fatty Acids 60- mg cpDR, Take  by mouth., Disp: , Rfl:     zinc 50 mg tab tablet, Take  by mouth daily. , Disp: , Rfl:     ferrous sulfate (Iron) 325 mg (65 mg iron) tablet, Take  by mouth Daily (before breakfast). , Disp: , Rfl:     lysine 1,000 mg tab, Take  by mouth., Disp: , Rfl:     norgestimate-ethinyl estradioL (Nate Grimes) 0.25-35 mg-mcg tab, Take 1 Tablet by mouth daily. , Disp: 1 Dose Pack, Rfl: 11    sertraline (ZOLOFT) 100 mg tablet, Take 1.5 Tablets by mouth daily. , Disp: 45 Tablet, Rfl: 0    valsartan (DIOVAN) 80 mg tablet, Take 1 Tablet by mouth daily. , Disp: 90 Tablet, Rfl: 1    hydrocortisone (HYTONE) 2.5 % topical cream, , Disp: , Rfl:     vitamin e (E GEMS) 1,000 unit capsule, Take 1,000 Units by mouth daily. , Disp: , Rfl:          Review of Systems: Denies issues with eyes, ears, mouth, nose. Denies fevers/chills, significant weight loss/gain. Denies chest pain, shortness of breath, nausea, vomiting, constipation, diarrhea or abdominal pain. Denies dysuria. Denies muscle aches, weakness, numbness or tingling. Denies issues with breasts. Denies bleeding/clotting d/o's. +Anxiety. Denies depression. Denies S/HI. OBJECTIVE:  BP (!) 166/82 (BP 1 Location: Left upper arm, BP Patient Position: Sitting, BP Cuff Size: Adult)   Pulse 70   Temp 96.9 °F (36.1 °C) (Temporal)   Resp 20   Ht 5' 3\" (1.6 m)   Wt 301 lb 1.6 oz (136.6 kg)   LMP 06/24/2022   SpO2 97%   BMI 53.34 kg/m²      Constitutional  · Appearance: well-nourished, well developed, alert, in no acute distress, obese    HENT  · Head and Face: appears normal    Neck  · Inspection/Palpation: normal appearance      Breasts   Symmetric, no palpable masses, no tenderness, no skin changes, no nipple abnormality, no nipple discharge, no axillary or supraclavicular lymphadenopathy.     Chest  · Respiratory Effort: normal      Gastrointestinal  · Abdominal Examination: abdomen non-tender to palpation, no masses present  · Liver and spleen: no hepatomegaly present, spleen not palpable      Genitourinary  · External Genitalia: normal appearance for age, no discharge present, no tenderness present, no inflammatory lesions present, no masses present, no atrophy present  · Vagina: limited exam due to heavy menstrual blood, pt unable to tolerate speculum so discontinued, also unable to tolerate single digit exam    Skin  · General Inspection: no rash, no lesions identified    Neurologic/Psychiatric  · Mental Status:  · Orientation: grossly oriented to person, place and time  · Mood and Affect: mood normal, affect appropriate          Assessment/plan:    ICD-10-CM ICD-9-CM    1. Encounter for gynecological examination without abnormal finding  Z01.419 V72.31    2. History of uterine fibroid  Z86.018 V13.29 US PELV NON OBS   3. History of ovarian cyst  Z87.42 V13.29 US PELV NON OBS   4. Menorrhagia with regular cycle  N92.0 626.2 US PELV NON OBS   5. Encounter for breast cancer screening using non-mammogram modality  Z12.39 V76.10         -Annual gynecologic exam.    -Cervical cancer screening- unable to obtain pap smear due to heavy menses. RTC when not on menses. Patient concerned about anxiety with pelvic exams. Can trial anxiolytic prior.     -Breast cancer screening- breast awareness discussed; mammograms beginning at age 36.    -STI screening-declnes testing. -HPV vaccination- counseled. Has been vaccinated. -Colon cancer screening- colonoscopy starting at age 39.     -Bone health-discussed weightbearing exercises, vitamin D and calcium supplementation.    -Pelvic ultrasound ordered per heavy menses. CBC TSH were appropriate in May 2022. She recently started COCPs. Monitor Bps. If remain elevated, she should discontinue the COCPs.

## 2022-06-28 RX ORDER — VALSARTAN 80 MG/1
80 TABLET ORAL DAILY
Qty: 90 TABLET | Refills: 1 | Status: SHIPPED | OUTPATIENT
Start: 2022-06-28

## 2022-06-29 ENCOUNTER — CLINICAL SUPPORT (OUTPATIENT)
Dept: SURGERY | Age: 32
End: 2022-06-29

## 2022-06-29 VITALS
BODY MASS INDEX: 51.91 KG/M2 | TEMPERATURE: 98.4 F | SYSTOLIC BLOOD PRESSURE: 131 MMHG | HEART RATE: 97 BPM | DIASTOLIC BLOOD PRESSURE: 84 MMHG | WEIGHT: 293 LBS | OXYGEN SATURATION: 97 % | HEIGHT: 63 IN | RESPIRATION RATE: 20 BRPM

## 2022-06-29 DIAGNOSIS — I10 ESSENTIAL HYPERTENSION: ICD-10-CM

## 2022-06-29 DIAGNOSIS — R06.83 SNORING: ICD-10-CM

## 2022-06-29 DIAGNOSIS — E66.01 OBESITY, MORBID, BMI 50 OR HIGHER (HCC): Primary | ICD-10-CM

## 2022-06-29 NOTE — PROGRESS NOTES
Nurse note from patient's weekly VLCD  class was reviewed. Pertinent medical concerns were:   Doing well     BP Readings from Last 3 Encounters:   06/29/22 131/84   06/27/22 (!) 166/82   06/22/22 (!) 146/82       Weight Metrics 6/29/2022 6/27/2022 6/22/2022 6/13/2022 6/7/2022 6/7/2022 5/20/2022   Weight 296 lb 6.4 oz 301 lb 1.6 oz 297 lb 6.4 oz 304 lb 1.6 oz - 311 lb 3.2 oz 315 lb 6.4 oz   Neck Circ (inches) - - - - 17.5 - -   Waist Measure Inches - - - - 54.5 - -   Body Fat % - - - - 49.6 - -   BMI 52.5 kg/m2 53.34 kg/m2 52.68 kg/m2 53.87 kg/m2 - 55.13 kg/m2 55.87 kg/m2       Current Outpatient Medications   Medication Sig Dispense Refill    cholecalciferol (Vitamin D3) (5000 Units/125 mcg) tab tablet Take  by mouth daily.  cyanocobalamin (Vitamin B-12) 1,000 mcg tablet Take 1,000 mcg by mouth daily.  magnesium oxide (MAG-OX) 400 mg tablet Take 400 mg by mouth daily.  Omega-3 Fatty Acids 60- mg cpDR Take  by mouth.  zinc 50 mg tab tablet Take  by mouth daily.  vitamin e (E GEMS) 1,000 unit capsule Take 1,000 Units by mouth daily.  ferrous sulfate (Iron) 325 mg (65 mg iron) tablet Take  by mouth Daily (before breakfast).  lysine 1,000 mg tab Take  by mouth.  norgestimate-ethinyl estradioL (ORTHO-CYCLEN, SPRINTEC) 0.25-35 mg-mcg tab Take 1 Tablet by mouth daily. 1 Dose Pack 11    sertraline (ZOLOFT) 100 mg tablet Take 1.5 Tablets by mouth daily. 45 Tablet 0    valsartan (DIOVAN) 80 mg tablet Take 1 Tablet by mouth daily.  90 Tablet 1    hydrocortisone (HYTONE) 2.5 % topical cream       amLODIPine (NORVASC) 5 mg tablet TAKE 1 TABLET BY MOUTH EVERY DAY 90 Tablet 1

## 2022-06-29 NOTE — PROGRESS NOTES
1. Have you been to the ER, urgent care clinic since your last visit? Hospitalized since your last visit? No    2. Have you seen or consulted any other health care providers outside of the [address] [place] [address] since your last visit? Include any pap smears or colon screening. No     06/26/2022      Progress Note: Weekly Education Class in the [facility] Weight Loss Program         Patient is on Very Low Calorie Diet [x] (4 meal replacements per day, 800 kcal/day)      Low Calorie Diet [] (2-3 meal replacements per day, 9457-1736 kcal/day)    1) Did patient have any new symptoms or physical problems? Yes []    No [x]    If yes, check & comment: weakness [], fatigue [], lightheadedness [], headache [], cramps [], cold intolerance [], hair loss [], diarrhea [], constipation [],  NA [] other:                   2) Has patient had any medical attention from other providers, urgent care or the emergency room this week? Yes []  No [x]       NA [], If yes, why:             3) Any other sugar sweetened beverages consumed this week? Yes []  No [x]    4) Did patient have any problems adhering to the diet? Yes [x]  No [] NA []    If yes, Vacation [], Celebrations [], Conferences [], Family Reunions [] other:   hunger          5) How many hours of sleep this week? 4.5-9   (range)  NA []    Number of meal replacements consumed daily? 2-3 (range)  NA []    Average ounces of water patient consumed daily this week (not including shakes)? 46   (divide the weekly total by 7)    Did you eat any food outside of the program? Yes [x] No []    Physical Activity Over the Past Week:    Cardio exercise: 60 min  Strength exercise: n/a workouts / week  Number of steps walked per day: 1406-5038    How has patient mood overall been this week?  Sad [], Happy [], Stressed [], Tired [], Content [], NA [], other  calm           Medications reconciled by nurse Yes [x]  No[]    Patient was given therapeutic recommendations for any noted side effects of their dietary approach based upon [facility] patient manual per providers recommendation.

## 2022-07-01 NOTE — PROGRESS NOTES
Nurse note from patient's weekly VLCD /  class was reviewed. Pertinent medical concerns were:   Down 5 lbs     BP Readings from Last 3 Encounters:   06/29/22 131/84   06/27/22 (!) 166/82   06/22/22 (!) 146/82       Weight Metrics 6/29/2022 6/27/2022 6/22/2022 6/13/2022 6/7/2022 6/7/2022 5/20/2022   Weight 296 lb 6.4 oz 301 lb 1.6 oz 297 lb 6.4 oz 304 lb 1.6 oz - 311 lb 3.2 oz 315 lb 6.4 oz   Neck Circ (inches) - - - - 17.5 - -   Waist Measure Inches - - - - 54.5 - -   Body Fat % - - - - 49.6 - -   BMI 52.5 kg/m2 53.34 kg/m2 52.68 kg/m2 53.87 kg/m2 - 55.13 kg/m2 55.87 kg/m2       Current Outpatient Medications   Medication Sig Dispense Refill    valsartan (DIOVAN) 80 mg tablet Take 1 Tablet by mouth daily. 90 Tablet 1    hydrocortisone (HYTONE) 2.5 % topical cream       amLODIPine (NORVASC) 5 mg tablet TAKE 1 TABLET BY MOUTH EVERY DAY 90 Tablet 1    cholecalciferol (Vitamin D3) (5000 Units/125 mcg) tab tablet Take  by mouth daily.  cyanocobalamin (Vitamin B-12) 1,000 mcg tablet Take 1,000 mcg by mouth daily.  magnesium oxide (MAG-OX) 400 mg tablet Take 400 mg by mouth daily.  Omega-3 Fatty Acids 60- mg cpDR Take  by mouth.  zinc 50 mg tab tablet Take  by mouth daily.  vitamin e (E GEMS) 1,000 unit capsule Take 1,000 Units by mouth daily.  ferrous sulfate (Iron) 325 mg (65 mg iron) tablet Take  by mouth Daily (before breakfast).  lysine 1,000 mg tab Take  by mouth.  norgestimate-ethinyl estradioL (ORTHO-CYCLEN, SPRINTEC) 0.25-35 mg-mcg tab Take 1 Tablet by mouth daily. 1 Dose Pack 11    sertraline (ZOLOFT) 100 mg tablet Take 1.5 Tablets by mouth daily.  45 Tablet 0

## 2022-07-01 NOTE — PROGRESS NOTES
Nurse note from patient's weekly VLCD  class was reviewed. Pertinent medical concerns were:   Down 5 lbs     BP Readings from Last 3 Encounters:   06/29/22 131/84   06/27/22 (!) 166/82   06/22/22 (!) 146/82       Weight Metrics 6/29/2022 6/27/2022 6/22/2022 6/13/2022 6/7/2022 6/7/2022 5/20/2022   Weight 296 lb 6.4 oz 301 lb 1.6 oz 297 lb 6.4 oz 304 lb 1.6 oz - 311 lb 3.2 oz 315 lb 6.4 oz   Neck Circ (inches) - - - - 17.5 - -   Waist Measure Inches - - - - 54.5 - -   Body Fat % - - - - 49.6 - -   BMI 52.5 kg/m2 53.34 kg/m2 52.68 kg/m2 53.87 kg/m2 - 55.13 kg/m2 55.87 kg/m2       Current Outpatient Medications   Medication Sig Dispense Refill    cholecalciferol (Vitamin D3) (5000 Units/125 mcg) tab tablet Take  by mouth daily.  cyanocobalamin (Vitamin B-12) 1,000 mcg tablet Take 1,000 mcg by mouth daily.  magnesium oxide (MAG-OX) 400 mg tablet Take 400 mg by mouth daily.  Omega-3 Fatty Acids 60- mg cpDR Take  by mouth.  zinc 50 mg tab tablet Take  by mouth daily.  vitamin e (E GEMS) 1,000 unit capsule Take 1,000 Units by mouth daily.  ferrous sulfate (Iron) 325 mg (65 mg iron) tablet Take  by mouth Daily (before breakfast).  lysine 1,000 mg tab Take  by mouth.  norgestimate-ethinyl estradioL (ORTHO-CYCLEN, SPRINTEC) 0.25-35 mg-mcg tab Take 1 Tablet by mouth daily. 1 Dose Pack 11    sertraline (ZOLOFT) 100 mg tablet Take 1.5 Tablets by mouth daily. 45 Tablet 0    valsartan (DIOVAN) 80 mg tablet Take 1 Tablet by mouth daily.  90 Tablet 1    hydrocortisone (HYTONE) 2.5 % topical cream       amLODIPine (NORVASC) 5 mg tablet TAKE 1 TABLET BY MOUTH EVERY DAY 90 Tablet 1

## 2022-07-04 PROBLEM — Z86.018 HISTORY OF UTERINE FIBROID: Status: ACTIVE | Noted: 2022-07-04

## 2022-07-04 PROBLEM — N92.0 MENORRHAGIA WITH REGULAR CYCLE: Status: ACTIVE | Noted: 2022-07-04

## 2022-07-05 ENCOUNTER — OFFICE VISIT (OUTPATIENT)
Dept: SURGERY | Age: 32
End: 2022-07-05
Payer: MEDICAID

## 2022-07-05 VITALS
SYSTOLIC BLOOD PRESSURE: 140 MMHG | WEIGHT: 293 LBS | RESPIRATION RATE: 18 BRPM | BODY MASS INDEX: 51.91 KG/M2 | OXYGEN SATURATION: 97 % | DIASTOLIC BLOOD PRESSURE: 84 MMHG | HEIGHT: 63 IN | TEMPERATURE: 98.1 F | HEART RATE: 70 BPM

## 2022-07-05 DIAGNOSIS — E66.01 OBESITY, MORBID, BMI 50 OR HIGHER (HCC): Primary | ICD-10-CM

## 2022-07-05 DIAGNOSIS — R06.83 SNORING: ICD-10-CM

## 2022-07-05 DIAGNOSIS — I10 ESSENTIAL HYPERTENSION: ICD-10-CM

## 2022-07-05 PROCEDURE — 99213 OFFICE O/P EST LOW 20 MIN: CPT | Performed by: FAMILY MEDICINE

## 2022-07-05 NOTE — PROGRESS NOTES
Weight Management. 1 month follow up. 1. Have you been to the ER, urgent care clinic since your last visit? Hospitalized since your last visit? No    2. Have you seen or consulted any other health care providers outside of the [address] [place] [address] since your last visit? Include any pap smears or colon screening.  No     BMI - 52.0

## 2022-07-05 NOTE — PROGRESS NOTES
[facility] Progress Note:   F/up Physician Visit    CC: Weight Management      Lois Chaudhary is a 33 y.o. female who is here for her  f/up physician visit for the VL  Program.    Weight Metrics 7/5/2022 7/5/2022 6/29/2022 6/27/2022 6/22/2022 6/13/2022 6/7/2022   Weight - 295 lb 8 oz 296 lb 6.4 oz 301 lb 1.6 oz 297 lb 6.4 oz 304 lb 1.6 oz -   Neck Circ (inches) 18.25 - - - - - 17.5   Waist Measure Inches 52.25 - - - - - 54.5   Body Fat % 48.6 - - - - - 49.6   BMI - 52.35 kg/m2 52.5 kg/m2 53.34 kg/m2 52.68 kg/m2 53.87 kg/m2 -         Outpatient Medications Marked as Taking for the 7/5/22 encounter (Office Visit) with Luz Morales MD   Medication Sig Dispense Refill    valsartan (DIOVAN) 80 mg tablet Take 1 Tablet by mouth daily. 90 Tablet 1    hydrocortisone (HYTONE) 2.5 % topical cream       amLODIPine (NORVASC) 5 mg tablet TAKE 1 TABLET BY MOUTH EVERY DAY 90 Tablet 1    cholecalciferol (Vitamin D3) (5000 Units/125 mcg) tab tablet Take  by mouth daily.  cyanocobalamin (Vitamin B-12) 1,000 mcg tablet Take 1,000 mcg by mouth daily.  magnesium oxide (MAG-OX) 400 mg tablet Take 400 mg by mouth daily.  Omega-3 Fatty Acids 60- mg cpDR Take  by mouth.  zinc 50 mg tab tablet Take  by mouth daily.  vitamin e (E GEMS) 1,000 unit capsule Take 1,000 Units by mouth daily.  ferrous sulfate (Iron) 325 mg (65 mg iron) tablet Take  by mouth Daily (before breakfast).  lysine 1,000 mg tab Take  by mouth.  norgestimate-ethinyl estradioL (ORTHO-CYCLEN, SPRINTEC) 0.25-35 mg-mcg tab Take 1 Tablet by mouth daily. 1 Dose Pack 11    sertraline (ZOLOFT) 100 mg tablet Take 1.5 Tablets by mouth daily. 45 Tablet 0         Participation   Did you attend clinic and class last week?  yes    Review of Systems  Since your last visit, have you experienced any complications? no  If yes, please list:     Are you taking an appetite suppressant? no  If so, is there any Chest Pain, Palpitations or Dizziness? HUNGER CONTROL: good    BP Readings from Last 3 Encounters:   07/05/22 (!) 150/84   06/29/22 131/84   06/27/22 (!) 166/82       SLEEP: 5-6    Have you received any other medical care this week? no  If yes, where and for what? Have you discontinued or changed any medicine or dose of your medicine since your last visit with Dr Heather Jackson? no  If yes, where and for what? Diet  How many ounces of calorie-free liquids did you consume each day? 50-60oz    How many meal replacements did you take each day? 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  yes If yes, please explain:      Exercise  Aerobic exercise: went to gym 2 times in last 2 weeks     Resistance exercise: 1 workouts / week  Any discomfort?  no     If yes, where? Objective  Visit Vitals  BP (!) 150/84 (BP 1 Location: Right arm, BP Patient Position: Sitting, BP Cuff Size: Adult long)   Pulse 70   Temp 98.1 °F (36.7 °C) (Oral)   Resp 18   Ht 5' 3\" (1.6 m)   Wt 295 lb 8 oz (134 kg)   LMP 06/24/2022   SpO2 97%   BMI 52.35 kg/m²     Patient's last menstrual period was 06/24/2022. Physical Exam  Appearance: well,   Mental:A&O x 3, NAD  H:NC/AT,  EENT:   EOMI, PERRL, No scleral icterus  Neck: no bruit or JVD  Lung: clear, No W/R  ABD: soft, active, nontender  Ext:  no Edema  Neuro: nonfocal  Assessment / Plan    Encounter Diagnoses   Name Primary?  Obesity, morbid, BMI 50 or higher ([address] Yes    Essential hypertension     Snoring      Diagnoses and all orders for this visit:    1. Obesity, morbid, BMI 50 or higher ([address]  Has started moving a little more  Still has not had the sleep study yet  Cont the LCD          2. Essential hypertension  Had salted popcorn last night  3. Snoring    Has appt next month for sleep study  1.   Weight management improved   Progress was reviewed with patient    2. Labs    Latest results reviewed with patient       face to face time with Judy Mathew consisted of counseling & coordinating and/or discussing treatment plans in reference to her obesity The primary encounter diagnosis was Obesity, morbid, BMI 50 or higher ([facility] [address] [facility] Diagnoses of Essential hypertension and Snoring were also pertinent to this visit.

## 2022-07-05 NOTE — PROGRESS NOTES
Progress Note: Weekly Education Class in the [facility] Weight Loss Program         Patient is on Very Low Calorie Diet [x] (4 meal replacements per day, 800 kcal/day)      Low Calorie Diet [] (2-3 meal replacements per day, 7295-5526 kcal/day)    1) Did patient have any new symptoms or physical problems? Yes []    No []    If yes, check & comment: weakness [x], fatigue [x], lightheadedness [], headache [x], cramps [x], cold intolerance [], hair loss [], diarrhea [], constipation [],  NA [] other:                                 2) Has patient had any medical attention from other providers, urgent care or the emergency room this week? Yes [x]  No []       NA [], If yes, why: GYN                                     3) Any other sugar sweetened beverages consumed this week? Yes [x]  No []    4) Did patient have any problems adhering to the diet? Yes []  No [] NA []    If yes, Vacation [], Celebrations [], Conferences [], Family Reunions [] other: Feeling sick. My period is on  for the second time this   month                                               5) How many hours of sleep this week? 5.25-9.5    (range)  NA []    Number of meal replacements consumed daily? 1-4  (range)  NA []    Average ounces of water patient consumed daily this week (not including shakes)? 50-susan? (divide the weekly total by 7)    Did you eat any food outside of the program? Yes [x] No []    Physical Activity Over the Past Week:    Cardio exercise: 45 min  Strength exercise: 0 workouts / week  Number of steps walked per day: 1266-7517    How has patient mood overall been this week? Sad [], Happy [], Stressed [], Tired [], Content [], NA [], other   On period           Medications reconciled by nurse Yes [x]  No[]    Patient was given therapeutic recommendations for any noted side effects of their dietary approach based upon [facility] patient manual per providers recommendation.

## 2022-07-06 ENCOUNTER — OFFICE VISIT (OUTPATIENT)
Dept: PRIMARY CARE CLINIC | Age: 32
End: 2022-07-06
Payer: MEDICAID

## 2022-07-06 VITALS
WEIGHT: 293 LBS | BODY MASS INDEX: 51.91 KG/M2 | TEMPERATURE: 97.6 F | HEART RATE: 89 BPM | RESPIRATION RATE: 22 BRPM | DIASTOLIC BLOOD PRESSURE: 88 MMHG | OXYGEN SATURATION: 98 % | HEIGHT: 63 IN | SYSTOLIC BLOOD PRESSURE: 136 MMHG

## 2022-07-06 DIAGNOSIS — N92.1 METRORRHAGIA: ICD-10-CM

## 2022-07-06 DIAGNOSIS — F32.81 PMDD (PREMENSTRUAL DYSPHORIC DISORDER): ICD-10-CM

## 2022-07-06 DIAGNOSIS — I10 ESSENTIAL HYPERTENSION: Primary | ICD-10-CM

## 2022-07-06 LAB
ALBUMIN SERPL-MCNC: 4.4 G/DL (ref 3.8–4.8)
ALBUMIN/GLOB SERPL: 2 {RATIO} (ref 1.2–2.2)
ALP SERPL-CCNC: 62 IU/L (ref 44–121)
ALT SERPL-CCNC: 18 IU/L (ref 0–32)
AST SERPL-CCNC: 18 IU/L (ref 0–40)
BILIRUB SERPL-MCNC: 0.3 MG/DL (ref 0–1.2)
BUN SERPL-MCNC: 8 MG/DL (ref 6–20)
BUN/CREAT SERPL: 10 (ref 9–23)
CALCIUM SERPL-MCNC: 8.9 MG/DL (ref 8.7–10.2)
CHLORIDE SERPL-SCNC: 102 MMOL/L (ref 96–106)
CO2 SERPL-SCNC: 23 MMOL/L (ref 20–29)
CREAT SERPL-MCNC: 0.81 MG/DL (ref 0.57–1)
EGFR: 98 ML/MIN/1.73
GLOBULIN SER CALC-MCNC: 2.2 G/DL (ref 1.5–4.5)
GLUCOSE SERPL-MCNC: 72 MG/DL (ref 65–99)
POTASSIUM SERPL-SCNC: 4.5 MMOL/L (ref 3.5–5.2)
PROT SERPL-MCNC: 6.6 G/DL (ref 6–8.5)
SODIUM SERPL-SCNC: 139 MMOL/L (ref 134–144)

## 2022-07-06 PROCEDURE — 99213 OFFICE O/P EST LOW 20 MIN: CPT | Performed by: FAMILY MEDICINE

## 2022-07-06 RX ORDER — BUPROPION HYDROCHLORIDE 100 MG/1
100 TABLET, EXTENDED RELEASE ORAL 2 TIMES DAILY
Qty: 60 TABLET | Refills: 2 | Status: SHIPPED | OUTPATIENT
Start: 2022-07-06 | End: 2022-09-07 | Stop reason: ALTCHOICE

## 2022-07-06 RX ORDER — SERTRALINE HYDROCHLORIDE 25 MG/1
25 TABLET, FILM COATED ORAL DAILY
Qty: 7 TABLET | Refills: 0 | Status: SHIPPED | OUTPATIENT
Start: 2022-07-06 | End: 2022-07-25

## 2022-07-06 NOTE — PROGRESS NOTES
1. \"Have you been to the ER, urgent care clinic since your last visit? Hospitalized since your last visit? \" No    2. \"Have you seen or consulted any other health care providers outside of the [address] [place] [address] since your last visit? \" No     3. For patients aged 39-70: Has the patient had a colonoscopy / FIT/ Cologuard? NA - based on age      If the patient is female:    4. For patients aged 41-77: Has the patient had a mammogram within the past 2 years? NA - based on age or sex      11. For patients aged 21-65: Has the patient had a pap smear?  No  Visit Vitals  /88 (BP 1 Location: Left upper arm, BP Patient Position: Sitting, BP Cuff Size: Large adult)   Pulse 89   Temp 97.6 °F (36.4 °C) (Temporal)   Resp 22   Ht 5' 3\" (1.6 m)   Wt 298 lb 3.2 oz (135.3 kg)   LMP 06/24/2022   SpO2 98%   BMI 52.82 kg/m²     Chief Complaint   Patient presents with    Follow-up     Medication follow up

## 2022-07-06 NOTE — PROGRESS NOTES
HPI     Chief Complaint   Patient presents with    Follow-up     Medication follow up        HPI:  Samia Brooks is a 33 y.o. female who has a history of PMDD, HTN, Obesity, Anemia. PMDD: Last visit, patient had concerns for worsening PMDD, which previously was treated with Zoloft. We restarted the zoloft 150mg but she says Dr. Pieter Zavala told her to take just 100mg out of concern for how it could effect weight. I previously recommended wellbutrin but patient was hesitant. She is open to this switch now. Abnormal menstrual bleeding: patient is following with Dr. Willem Rucker. I started her on OCPs but she's been experiencing frequent, heavy, menses. She gets two bleeding episodes a month. The second episode is not as heavy as her menstrual cycle but still with considerable flow. Her menses are associated with clots and cramping. She reports OB informed her it can take a few months of so for cycles to regulate. She denies fatigue, palpitations, dyspnea. She had pap smear scheduled but could not tolerate, it has been rescheduled for later this month. HTN: at goal on Diovan. No leg swelling. Obesity: patient is now following with weight loss clinic, Dr. Pieter Zavala. She is not consistent with exercise and is currently doing shakes/meal replacements. Weight Metrics 7/6/2022 7/5/2022 7/5/2022 6/29/2022 6/27/2022 6/22/2022 6/13/2022   Weight 298 lb 3.2 oz - 295 lb 8 oz 296 lb 6.4 oz 301 lb 1.6 oz 297 lb 6.4 oz 304 lb 1.6 oz   Neck Circ (inches) - 18.25 - - - - -   Waist Measure Inches - 52.25 - - - - -   Body Fat % - 48.6 - - - - -   BMI 52.82 kg/m2 - 52.35 kg/m2 52.5 kg/m2 53.34 kg/m2 52.68 kg/m2 53.87 kg/m2       No Known Allergies    Current Outpatient Medications   Medication Sig    buPROPion SR (WELLBUTRIN SR) 100 mg SR tablet Take 1 Tablet by mouth two (2) times a day.  sertraline (ZOLOFT) 25 mg tablet Take 1 Tablet by mouth daily.  valsartan (DIOVAN) 80 mg tablet Take 1 Tablet by mouth daily.     hydrocortisone (HYTONE) 2.5 % topical cream     amLODIPine (NORVASC) 5 mg tablet TAKE 1 TABLET BY MOUTH EVERY DAY    cholecalciferol (Vitamin D3) (5000 Units/125 mcg) tab tablet Take  by mouth daily.  cyanocobalamin (Vitamin B-12) 1,000 mcg tablet Take 1,000 mcg by mouth daily.  magnesium oxide (MAG-OX) 400 mg tablet Take 400 mg by mouth daily.  Omega-3 Fatty Acids 60- mg cpDR Take  by mouth.  zinc 50 mg tab tablet Take  by mouth daily.  ferrous sulfate (Iron) 325 mg (65 mg iron) tablet Take  by mouth Daily (before breakfast).  lysine 1,000 mg tab Take  by mouth.  norgestimate-ethinyl estradioL (ORTHO-CYCLEN, SPRINTEC) 0.25-35 mg-mcg tab Take 1 Tablet by mouth daily.  vitamin e (E GEMS) 1,000 unit capsule Take 1,000 Units by mouth daily. (Patient not taking: Reported on 7/6/2022)     No current facility-administered medications for this visit. Review of Systems   Constitutional: Negative for fever and malaise/fatigue. Cardiovascular: Negative for chest pain and palpitations. Neurological: Negative for dizziness and headaches. Reviewed PmHx, FmHx, SocHx as well as meds and allergies, updated and dated in the chart. Objective     Visit Vitals  /88 (BP 1 Location: Left upper arm, BP Patient Position: Sitting, BP Cuff Size: Large adult)   Pulse 89   Temp 97.6 °F (36.4 °C) (Temporal)   Resp 22   Ht 5' 3\" (1.6 m)   Wt 298 lb 3.2 oz (135.3 kg)   SpO2 98%   BMI 52.82 kg/m²     Physical Exam  Vitals and nursing note reviewed. Constitutional:       Appearance: Normal appearance. Cardiovascular:      Rate and Rhythm: Normal rate and regular rhythm. Heart sounds: Normal heart sounds. Pulmonary:      Effort: Pulmonary effort is normal. No respiratory distress. Breath sounds: Normal breath sounds. Neurological:      General: No focal deficit present. Mental Status: She is alert and oriented to person, place, and time.              Assessment and Plan     Diagnoses and all orders for this visit:    1. Essential hypertension  Comments: At goal. Continue current regimen. 2. PMDD (premenstrual dysphoric disorder)  Comments:  Taper off zoloft (100mg-->50mg-->25mg in weekly increments), then start Wellbutrin 100mg daily x3 days, increase to 100mg BID if tolerated. Orders:  -     buPROPion SR (WELLBUTRIN SR) 100 mg SR tablet; Take 1 Tablet by mouth two (2) times a day. -     sertraline (ZOLOFT) 25 mg tablet; Take 1 Tablet by mouth daily. 3. Metrorrhagia  Comments: Following up with Dr. Remy Copeland, will have to re-do pap. She is going to reach out to her regarding metrorrhagia as well. As applicable:  Medication Side Effects and Warnings were discussed with patient. Patient Labs were reviewed and or requested. Patient Past Records were reviewed and or requested. Follow-up and Dispositions    · Return in about 2 months (around 9/6/2022) for Depression. I have discussed the diagnosis with the patient and the intended plan as seen in the above orders. The patient has received an after-visit summary and questions were answered concerning future plans. I have discussed medication side effects and warnings with the patient as well.       Hyun Saavedra MD  [address] [place] [address]

## 2022-07-06 NOTE — PATIENT INSTRUCTIONS
Decrease to 1/2 tablet of 100mg daily for one week then go down to 1 tablet a day of the 25mg zoloft. Then stop and switch to the wellbutrin. For the 1st three days just take one tablet of wellbutrin a day. After three days, if tolerated, increase to one tablet twice a day of the wellbutrin.

## 2022-07-08 ENCOUNTER — CLINICAL SUPPORT (OUTPATIENT)
Dept: SURGERY | Age: 32
End: 2022-07-08

## 2022-07-08 DIAGNOSIS — E66.01 OBESITY, MORBID, BMI 50 OR HIGHER (HCC): Primary | ICD-10-CM

## 2022-07-08 NOTE — PROGRESS NOTES
[facility]  Metabolic Program Follow-up Nutrition Consult    Date: 2022   Physician: Antionette Aldrich MD  Name: Samia Brooks  :  1990    Type of Plan: VLCD  Weeks on Plan: 1 month  Virtual visit was completed through [address] [place] [address]. ASSESSMENT:    Medications/Supplements:   Prior to Admission medications    Medication Sig Start Date End Date Taking? Authorizing Provider   buPROPion SR [facility] SR) 100 mg SR tablet Take 1 Tablet by mouth two (2) times a day. 22   Lawyer Nimesh MD   sertraline (ZOLOFT) 25 mg tablet Take 1 Tablet by mouth daily. 22   Lawyer Nimesh MD   valsartan (DIOVAN) 80 mg tablet Take 1 Tablet by mouth daily. 22   Lawyer Nimesh MD   hydrocortisone (HYTONE) 2.5 % topical cream  22   Provider, Historical   amLODIPine (NORVASC) 5 mg tablet TAKE 1 TABLET BY MOUTH EVERY DAY 22   Lawyer Nimesh MD   cholecalciferol (Vitamin D3) (5000 Units/125 mcg) tab tablet Take  by mouth daily. Provider, Historical   cyanocobalamin (Vitamin B-12) 1,000 mcg tablet Take 1,000 mcg by mouth daily. Provider, Historical   magnesium oxide (MAG-OX) 400 mg tablet Take 400 mg by mouth daily. Provider, Historical   Omega-3 Fatty Acids 60- mg cpDR Take  by mouth. Provider, Historical   zinc 50 mg tab tablet Take  by mouth daily. Provider, Historical   vitamin e (E GEMS) 1,000 unit capsule Take 1,000 Units by mouth daily. Patient not taking: Reported on 2022    Provider, Historical   ferrous sulfate (Iron) 325 mg (65 mg iron) tablet Take  by mouth Daily (before breakfast). Provider, Historical   lysine 1,000 mg tab Take  by mouth. Provider, Historical   norgestimate-ethinyl estradioL (ORTHO-CYCLEN, SPRINTEC) 0.25-35 mg-mcg tab Take 1 Tablet by mouth daily.  22   Lawyer Nimesh MD              Starting Weight: 311#  Current Weight: 295#  Overall Pounds Lost: 16# Overall Pounds Gained: 0    Exercise/Physical Activity: boxing class twice, gym membership went once. Longer walks/jogs with dog. Is patient using [employer] products: yes  If yes, how many per day: 3-4    Aversions/side effects of product/program: none reported    Fluids used to mix with products: water    Reported Diet History: Had a few days where ate outside foods, somewhat impulsive, when craving it's salty and crunchy not sweet. Now counting calories around 800 per day. Using I watch. Eating less volume. Reading labels with focus on sodium, sugar, calories, and protein. Outside food: buffalo wings, crackers, tuna, protein chips, turkey sticks. Averaging 3-4 products per day. May be in combination of powders and bar or all powders in a day. Beverages: Most days 50-60 ounces water per day. Barriers/concerns preventing patient from achieving goal(s) since last visit: finances and trying to buy products. Celebrating birthday at [employer] this weekend. NUTRITION INTERVENTION:  Pt educated on nutrition recommendations for the [employer]irection Very Low Calorie Plan (VLCD), with the specific meal pattern of 4 meal replacements every day totaling 800kcals/every day and 40-50 grams carbohydrates/every day. Choose lean protein and non-starchy vegetable OR an additional meal replacement on days of increased activity/exercise to prevent injury, dizziness/lightheadedness, or delay in healing/repairing after exercise. Consult provider and RD prior to increase in exercise routine. IF choosing grocery meal/snack: Read all nutrition labels. Demonstrated and emphasized identifying serving size, total fat, sugar and protein content. Defined low fat as </= 3 g per serving. Discussed lean and extra lean sources of protein. Avoid foods with sugar listed in the first 3 ingredients and >/10 g sugar per serving. Consume meal replacements every three to four hours or pattern as discussed with provider. Mix with water. May add herbs/spices for taste.   Refer to recipe book for meal replacements ideas to alter taste, texture, and temperature while keeping macronutrient distribution within program guidelines. Practice mindful eating habits; take small bites, chew thoroughly, avoid distractions, utilize hunger/fullness scale. Reviewed attendance policy of attending weekly nutrition classes. Metabolic support group recommended, and increase physical activity (approved per MD) for long term weight maintenance. NUTRITION MONITORING AND EVALUATION: 16# loss x 30 days. Doing well, making appropriate dietary and lifestyle changes for long term success, eating routinely, focusing on labels, and moving more. Discussed increasing water intake and adding a fifth packet and/or 3-4 ounces lean protein on jogging or gym days to preserve lean muscle and assist with recovery. Pt motivated, continued adherence likely. Followup one month. Specific tips and techniques to facilitate compliance with above recommendations were provided and discussed. If further details are desired please contact me at [phone]. This phone number was also provided to the patient for any further questions or concerns.           Ted Covarrubias, MS, RD, LDN

## 2022-07-13 ENCOUNTER — CLINICAL SUPPORT (OUTPATIENT)
Dept: SURGERY | Age: 32
End: 2022-07-13

## 2022-07-13 VITALS
HEART RATE: 73 BPM | WEIGHT: 293 LBS | BODY MASS INDEX: 51.91 KG/M2 | RESPIRATION RATE: 18 BRPM | OXYGEN SATURATION: 96 % | HEIGHT: 63 IN | DIASTOLIC BLOOD PRESSURE: 84 MMHG | SYSTOLIC BLOOD PRESSURE: 132 MMHG | TEMPERATURE: 98.4 F

## 2022-07-13 DIAGNOSIS — E66.01 OBESITY, MORBID, BMI 50 OR HIGHER (HCC): Primary | ICD-10-CM

## 2022-07-13 DIAGNOSIS — I10 ESSENTIAL HYPERTENSION: ICD-10-CM

## 2022-07-13 NOTE — PROGRESS NOTES
Weight Management. 1. Have you been to the ER, urgent care clinic since your last visit? Hospitalized since your last visit? No    2. Have you seen or consulted any other health care providers outside of the [address] [place] [address] since your last visit? Include any pap smears or colon screening. No       Progress Note: Weekly Education Class in the [facility]         Patient is on Very Low Calorie Diet [x] (4 meal replacements per day, 800 kcal/day)      Low Calorie Diet [] (2-3 meal replacements per day, 7148-5055 kcal/day)    1) Did patient have any new symptoms or physical problems? Yes []    No []    If yes, check & comment: weakness [], fatigue [], lightheadedness [x], headache [], cramps [x], cold intolerance [], hair loss [], diarrhea [x], constipation [],  NA [] other:                                 2) Has patient had any medical attention from other providers, urgent care or the emergency room this week? Yes []  No [x]       NA [], If yes, why:                                       3) Any other sugar sweetened beverages consumed this week? Yes [x]  No []    4) Did patient have any problems adhering to the diet? Yes [x]  No [] NA []    If yes, Vacation [x], Celebrations [x], Conferences [], Family Reunions [] other: My birthday! Also didn't have  enough money to buy my  shakes so I was improvising  until. .. 5) How many hours of sleep this week? 5.25-7.5    (range)  NA []    Number of meal replacements consumed daily? 1-2  (range)  NA []    Average ounces of water patient consumed daily this week (not including shakes)? NOT ANSWERED       (divide the weekly total by 7)    Did you eat any food outside of the program? Yes [] No []NOT ANSWERED      Physical Activity Over the Past Week:    Cardio exercise: 0 min  Strength exercise: 0 workouts / week  Number of steps walked per day: 0    How has patient mood overall been this week? Sad [], Happy [], Stressed [], Tired [x], Content [], NA [], other           Medications reconciled by nurse Yes [x]  No[]    Patient was given therapeutic recommendations for any noted side effects of their dietary approach based upon New Direction patient manual per providers recommendation.

## 2022-07-18 ENCOUNTER — TRANSCRIBE ORDER (OUTPATIENT)
Dept: SCHEDULING | Age: 32
End: 2022-07-18

## 2022-07-18 DIAGNOSIS — Z86.018 HISTORY OF UTERINE FIBROID: Primary | ICD-10-CM

## 2022-07-18 DIAGNOSIS — N92.0 MENORRHAGIA WITH REGULAR CYCLE: ICD-10-CM

## 2022-07-18 DIAGNOSIS — Z87.42 HISTORY OF OVARIAN CYST: ICD-10-CM

## 2022-07-22 ENCOUNTER — CLINICAL SUPPORT (OUTPATIENT)
Dept: SURGERY | Age: 32
End: 2022-07-22

## 2022-07-22 ENCOUNTER — VIRTUAL VISIT (OUTPATIENT)
Dept: SLEEP MEDICINE | Age: 32
End: 2022-07-22
Payer: MEDICAID

## 2022-07-22 VITALS
HEART RATE: 74 BPM | OXYGEN SATURATION: 97 % | DIASTOLIC BLOOD PRESSURE: 96 MMHG | HEIGHT: 63 IN | BODY MASS INDEX: 51.91 KG/M2 | SYSTOLIC BLOOD PRESSURE: 150 MMHG | RESPIRATION RATE: 20 BRPM | WEIGHT: 293 LBS

## 2022-07-22 DIAGNOSIS — E66.01 OBESITY, MORBID, BMI 50 OR HIGHER (HCC): Primary | ICD-10-CM

## 2022-07-22 DIAGNOSIS — G47.33 OSA (OBSTRUCTIVE SLEEP APNEA): Primary | ICD-10-CM

## 2022-07-22 DIAGNOSIS — F32.A ANXIETY AND DEPRESSION: ICD-10-CM

## 2022-07-22 DIAGNOSIS — I10 PRIMARY HYPERTENSION: ICD-10-CM

## 2022-07-22 DIAGNOSIS — F41.9 ANXIETY AND DEPRESSION: ICD-10-CM

## 2022-07-22 DIAGNOSIS — I10 ESSENTIAL HYPERTENSION: ICD-10-CM

## 2022-07-22 DIAGNOSIS — R06.83 SNORING: ICD-10-CM

## 2022-07-22 PROCEDURE — 99203 OFFICE O/P NEW LOW 30 MIN: CPT | Performed by: INTERNAL MEDICINE

## 2022-07-22 NOTE — PROGRESS NOTES
1. Have you been to the ER, urgent care clinic since your last visit? Hospitalized since your last visit? No    2. Have you seen or consulted any other health care providers outside of the [address] [place] [address] since your last visit? Include any pap smears or colon screening. No    Pt blood pressure is high. Pt states she took her blood pressure medication a few hours ago. Denied blurry vision, dizziness, chest pain, arm pain weakness or swelling legs. Will report pt's blood pressure to Dr. Beryle Dear.     07/17/2022  Progress Note: Weekly Education Class in the [facility]         Patient is on Very Low Calorie Diet [x] (4 meal replacements per day, 800 kcal/day)      Low Calorie Diet [] (2-3 meal replacements per day, 3452-4051 kcal/day)    1) Did patient have any new symptoms or physical problems? Yes [x]    No []    If yes, check & comment: weakness [], fatigue [], lightheadedness [], headache [], cramps [x], cold intolerance [], hair loss [], diarrhea [], constipation [],  NA [] other:                                 2) Has patient had any medical attention from other providers, urgent care or the emergency room this week? Yes []  No [x]       NA [], If yes, why:                                       3) Any other sugar sweetened beverages consumed this week? Yes [x]  No []    4) Did patient have any problems adhering to the diet? Yes [x]  No [] NA []    If yes, Vacation [], Celebrations [x], Conferences [], Family Reunions [] other:    Margaret Mejia, got sick with covid                                            5) How many hours of sleep this week? 43-8   (range)  NA []    Number of meal replacements consumed daily? 0-4 (range)  NA []    Average ounces of water patient consumed daily this week (not including shakes)?    45  (divide the weekly total by 7)    Did you eat any food outside of the program? Yes [x] No []    Physical Activity Over the Past Week:    Cardio exercise: 120 min  Strength exercise: n/a workouts / week  Number of steps walked per PIM:[id number]    How has patient mood overall been this week? Sad [], Happy [], Stressed [], Tired [x], Content [], NA [], other            Medications reconciled by nurse Yes [x]  No[]    Patient was given therapeutic recommendations for any noted side effects of their dietary approach based upon [employer] patient manual per providers recommendation.

## 2022-07-22 NOTE — PROGRESS NOTES
[address] [place], [address]  Tel.  [phone]  Fax. [address]  [place], [address]  Tel.  [phone]  Fax. [phone] [address]  Tel.  [phone]  Fax. [phone]       Fabian Grace is a 27y.o. year old female seen for evaluation of a sleep disorder. ASSESSMENT/PLAN:      ICD-10-CM ICD-9-CM    1. ISABEL (obstructive sleep apnea)  G47.33 327.23 SLEEP STUDY UNATTENDED, 4 CHANNEL      2. Anxiety and depression  F41.9 300.00     F32. A 311       3. Primary hypertension  I10 401.9       4. BMI 50.0-59.9, adult [facility])  [id number] V85.43           Patient has a history and examination consistent with the diagnosis of sleep apnea. * The patient currently has a Moderate Risk for having sleep apnea. STOP-BANG score 4.    * Sleep testing was ordered for initial evaluation. Orders Placed This Encounter    SLEEP STUDY UNATTENDED, 4 CHANNEL     Order Specific Question:   Reason for Exam     Answer:   ISABEL       * She was provided information on sleep apnea including corresponding risk factors and the importance of proper treatment. * Treatment options were reviewed in detail. She would like to proceed with PAP therapy. Patient will be seen in follow-up in 6-8 weeks after PAP setup to gauge treatment response and adherence to therapy. * The patient was counseled regarding proper sleep hygiene, with emphasis on ensuring sufficient total sleep time; safe driving and the benefits of exercise and weight loss. * All of her questions were addressed. 2. Recommended a dedicated weight loss program through appropriate diet and exercise regimen as significant weight reduction has been shown to reduce severity of obstructive sleep apnea. SUBJECTIVE/OBJECTIVE:    Fabian Grace is an 27 y.o. female referred for evaluation for a sleep disorder.  She complains of snoring associated with awakening in the middle of the night because of urination. Patient reports of poor sleep quality, sleep is not restorative and patient feels tired and fatigued during the day. Symptoms began several years ago, unchanged since that time. She usually can fall asleep in 20 -60 minutes. Family or house members note snoring. She denies of symptoms indicative of cataplexy, sleep paralysis or sleep related hallucinations. She denies of a history of unusual movements occurring during sleep. Kal Santillan (P) does wake up frequently at night. She (P) is bothered by waking up too early and left unable to get back to sleep. She actually sleeps about (P) 6 hours at night and wakes up about (P) 4 times during the night. She (P) does not work shifts: Avtar Mustafa indicates she (P) does get too little sleep at night. Her bedtime is (P) 0200. She awakens at (P) 0900. She (P) does take naps. She takes (P) 2 naps a week lasting (P) Other. She has the following observed behaviors: (P) Grinding teeth, Becoming very rigid and/or shaking;  . Other remarks: The patient has not undergone diagnostic testing for the current problems. Review of Systems:  Constitutional:  No significant weight loss or weight gain  Eyes:  No blurred vision  CVS:  No significant chest pain  Pulm:  No significant shortness of breath  GI:  No significant nausea or vomiting  :  significant nocturia  Musculoskeletal:  No significant joint pain at night  Skin:  No significant rashes  Neuro:  No significant dizziness   Psych:  No active mood issues    Sleep Review of Systems: notable for Positive difficulty falling asleep; Positive awakenings at night; Positive perceived regular dreaming; Negative nightmares; Positive  early morning headaches; Positive  memory problems; Positive  concentration issues; Negative caffeine;  Negative alcohol;   Negative history of any automobile or occupational accidents due to daytime drowsiness.       Hughson Sleepiness Score: (P) 5   and Modified F.O.S.Q. Score Total / 2: (P) 15.5    Patient-Reported Vitals 7/21/2022   Patient-Reported Weight -   Patient-Reported Height -   Patient-Reported Pulse -   Patient-Reported LMP 7/18/2022       Physical Exam completed by visual and auditory observation of patient with verbal input from patient. General:   Alert, oriented, not in acute distress   Eyes:  Anicteric Sclerae; no obvious strabismus   Nose:  No obvious nasal septum deviation    Neck:   Midline trachea, no visible mass   Chest/Lungs:  Respiratory effort normal, no visualized signs of difficulty breathing or respiratory distress   CVS:  No JVD   Extremities:  No obvious rashes noted on face, neck, or hands   Neuro:  No facial asymmetry, no focal deficits; no obvious tremor    Psych:  Normal affect,  normal countenance     Fazal Choe is being evaluated by a Virtual Visit (video visit) encounter to address concerns as mentioned above. A caregiver was present when appropriate. Due to this being a TeleHealth encounter (During HGJWY-07 public health emergency), evaluation of the following organ systems was limited: Vitals/Constitutional/EENT/Resp/CV/GI//MS/Neuro/Skin/Heme-Lymph-Imm. Pursuant to the emergency declaration under the [address] [place] [address] and the [employer] and [employer] Act, this Virtual Visit was conducted with patient's (and/or legal guardian's) consent, to reduce the patient's risk of exposure to COVID-19 and provide necessary medical care. Patient identification was verified at the start of the visit: YES using name and date of birth. Services were provided through a video synchronous discussion virtually to substitute for in-person clinic visit. I was at home while conducting this encounter, patient located at their home or alternate location of their choice.         An electronic signature was used to authenticate this note.      Erika Lefort, MD, FAASM  Diplomate American Board of Sleep Medicine  Diplomate in Sleep Medicine - ABP    Electronically signed.  07/22/22

## 2022-07-22 NOTE — PATIENT INSTRUCTIONS
[address] [place]. [place], [address]  Tel.  [phone]  Fax. [address] [facility] 60  [place], [address] [facility]  Tel.  [phone]  Fax. [phone] [address]  Tel.  [phone]  Fax. [phone]     Sleep Apnea: After Your Visit  Your Care Instructions  Sleep apnea occurs when you frequently stop breathing for 10 seconds or longer during sleep. It can be mild to severe, based on the number of times per hour that you stop breathing or have slowed breathing. Blocked or narrowed airways in your nose, mouth, or throat can cause sleep apnea. Your airway can become blocked when your throat muscles and tongue relax during sleep. Sleep apnea is common, occurring in 1 out of 20 individuals. Individuals having any of the following characteristics should be evaluated and treated right away due to high risk and detrimental consequences from untreated sleep apnea:  Obesity  Congestive Heart failure  Atrial Fibrillation  Uncontrolled Hypertension  Type II Diabetes  Night-time Arrhythmias  Stroke  Pulmonary Hypertension  High-risk Driving Populations (pilots, truck drivers, etc.)  Patients Considering Weight-loss Surgery    How do you know you have sleep apnea? You probably have sleep apnea if you answer 'yes' to 3 or more of the following questions:  S - Have you been told that you Snore? T - Are you often Tired during the day? O - Has anyone Observed you stop breathing while sleeping? P- Do you have (or are being treated for) high blood Pressure? B - Are you obese (Body Mass Index > 35)? A - Is your Age 48years old or older? N - Is your Neck size greater than 16 inches? G - Are you male Gender? A sleep physician can prescribe a breathing device that prevents tissues in the throat from blocking your airway. Or your doctor may recommend using a dental device (oral breathing device) to help keep your airway open.  In some cases, surgery may be needed to remove enlarged tissues in the throat. Follow-up care is a key part of your treatment and safety. Be sure to make and go to all appointments, and call your doctor if you are having problems. It's also a good idea to know your test results and keep a list of the medicines you take. How can you care for yourself at home? Lose weight, if needed. It may reduce the number of times you stop breathing or have slowed breathing. Go to bed at the same time every night. Sleep on your side. It may stop mild apnea. If you tend to roll onto your back, sew a pocket in the back of your [employer] top. Put a tennis ball into the pocket, and stitch the pocket shut. This will help keep you from sleeping on your back. Avoid alcohol and medicines such as sleeping pills and sedatives before bed. Do not smoke. Smoking can make sleep apnea worse. If you need help quitting, talk to your doctor about stop-smoking programs and medicines. These can increase your chances of quitting for good. Prop up the head of your bed 4 to 6 inches by putting bricks under the legs of the bed. Treat breathing problems, such as a stuffy nose, caused by a cold or allergies. Use a continuous positive airway pressure (CPAP) breathing machine if lifestyle changes do not help your apnea and your doctor recommends it. The machine keeps your airway from closing when you sleep. If CPAP does not help you, ask your doctor whether you should try other breathing machines. A bilevel positive airway pressure machine has two types of air pressureâone for breathing in and one for breathing out. Another device raises or lowers air pressure as needed while you breathe. If your nose feels dry or bleeds when using one of these machines, talk with your doctor about increasing moisture in the air. A humidifier may help.   If your nose is runny or stuffy from using a breathing machine, talk with your doctor about using decongestants or a corticosteroid nasal spray.  When should you call for help? Watch closely for changes in your health, and be sure to contact your doctor if:  You still have sleep apnea even though you have made lifestyle changes. You are thinking of trying a device such as CPAP. You are having problems using a CPAP or similar machine. Where can you learn more? Go to [employer]. Enter W160 in the search box to learn more about \"Sleep Apnea: After Your Visit. \"   © 6125-4840 Healthwise, Incorporated. Care instructions adapted under license by [facility] (which disclaims liability or warranty for this information). This care instruction is for use with your licensed healthcare professional. If you have questions about a medical condition or this instruction, always ask your healthcare professional. Joanna Taveras any warranty or liability for your use of this information. PROPER SLEEP HYGIENE    What to avoid  Do not have drinks with caffeine, such as coffee or black tea, for 8 hours before bed. Do not smoke or use other types of tobacco near bedtime. Nicotine is a stimulant and can keep you awake. Avoid drinking alcohol late in the evening, because it can cause you to wake in the middle of the night. Do not eat a big meal close to bedtime. If you are hungry, eat a light snack. Do not drink a lot of water close to bedtime, because the need to urinate may wake you up during the night. Do not read or watch TV in bed. Use the bed only for sleeping and sexual activity. What to try  Go to bed at the same time every night, and wake up at the same time every morning. Do not take naps during the day. Keep your bedroom quiet, dark, and cool. Get regular exercise, but not within 3 to 4 hours of your bedtime. .  Sleep on a comfortable pillow and mattress. If watching the clock makes you anxious, turn it facing away from you so you cannot see the time.   If you worry when you lie down, start a worry book. Well before bedtime, write down your worries, and then set the book and your concerns aside. Try meditation or other relaxation techniques before you go to bed. If you cannot fall asleep, get up and go to another room until you feel sleepy. Do something relaxing. Repeat your bedtime routine before you go to bed again. Make your house quiet and calm about an hour before bedtime. Turn down the lights, turn off the TV, log off the computer, and turn down the volume on music. This can help you relax after a busy day. Drowsy Driving  The [address] [place] [address] Road Traffic Safety Administration cites drowsiness as a causing factor in more than 502,796 police reported crashes annually, resulting in 76,000 injuries and 1,500 deaths. Other surveys suggest 55% of people polled have driven while drowsy in the past year, 23% had fallen asleep but not crashed, 3% crashed, and 2% had and accident due to drowsy driving. Who is at risk? Young Drivers: One study of drowsy driving accidents states that 55% of the drivers were under 25 years. Of those, 75% were male. Shift Workers and Travelers: People who work overnight or travel across time zones frequently are at higher risk of experiencing Circadian Rhythm Disorders. They are trying to work and function when their body is programed to sleep. Sleep Deprived: Lack of sleep has a serious impact on your ability to pay attention or focus on a task. Consistently getting less than the average of 8 hours your body needs creates partial or cumulative sleep deprivation. Untreated Sleep Disorders: Sleep Apnea, Narcolepsy, R.L.S., and other sleep disorders (untreated) prevent a person from getting enough restful sleep. This leads to excessive daytime sleepiness and increases the risk for drowsy driving accidents by up to 7 times. Medications / Alcohol: Even over the counter medications can cause drowsiness.  Medications that impair a drivers attention should have a warning label. Alcohol naturally makes you sleepy and on its own can cause accidents. Combined with excessive drowsiness its effects are amplified. Signs of Drowsy Driving:   * You don't remember driving the last few miles   * You may drift out of your janice   * You are unable to focus and your thoughts wander   * You may yawn more often than normal   * You have difficulty keeping your eyes open / nodding off   * Missing traffic signs, speeding, or tailgating  Prevention-   Good sleep hygiene, lifestyle and behavioral choices have the most impact on drowsy driving. There is no substitute for sleep and the average person requires 8 hours nightly. If you find yourself driving drowsy, stop and sleep. Consider the sleep hygiene tips provided during your visit as well. Medication Refill Policy: Refills for all medications require 1 week advance notice. Please have your pharmacy fax a refill request. We are unable to fax, or call in \"controled substance\" medications and you will need to pick these prescriptions up from our office. Ludi Activation    Thank you for requesting access to Ludi. Please follow the instructions below to securely access and download your online medical record. Ludi allows you to send messages to your doctor, view your test results, renew your prescriptions, schedule appointments, and more. How Do I Sign Up? In your internet browser, go to [email or web address]. Click on the First Time User? Click Here link in the Sign In box. You will see the New Member Sign Up page. Enter your Ludi Access Code exactly as it appears below. You will not need to use this code after youve completed the sign-up process. If you do not sign up before the expiration date, you must request a new code. Ludi Access Code:  Activation code not generated  Current Ludi Status: Active (This is the date your Ludi access code will )    Enter the last four digits of your Social Security Number (xxxx) and Date of Birth (mm/dd/yyyy) as indicated and click Submit. You will be taken to the next sign-up page. Create a Aspects Software ID. This will be your Aspects Software login ID and cannot be changed, so think of one that is secure and easy to remember. Create a [employer] password. You can change your password at any time. Enter your Password Reset Question and Answer. This can be used at a later time if you forget your password. Enter your e-mail address. You will receive e-mail notification when new information is available in [address] Click Sign Up. You can now view and download portions of your medical record. Click the [employer] link to download a portable copy of your medical information. Additional Information    If you have questions, please call [phone]. Remember, Aspects Software is NOT to be used for urgent needs. For medical emergencies, dial 911.

## 2022-07-25 ENCOUNTER — HOSPITAL ENCOUNTER (OUTPATIENT)
Dept: ULTRASOUND IMAGING | Age: 32
Discharge: HOME | End: 2022-07-25
Attending: OBSTETRICS & GYNECOLOGY
Payer: MEDICAID

## 2022-07-25 DIAGNOSIS — Z01.419 WELL WOMAN EXAM WITH ROUTINE GYNECOLOGICAL EXAM: ICD-10-CM

## 2022-07-25 DIAGNOSIS — Z86.018 HISTORY OF UTERINE FIBROID: ICD-10-CM

## 2022-07-25 DIAGNOSIS — Z87.42 HISTORY OF OVARIAN CYST: ICD-10-CM

## 2022-07-25 DIAGNOSIS — N92.0 MENORRHAGIA WITH REGULAR CYCLE: ICD-10-CM

## 2022-07-25 DIAGNOSIS — N92.0 MENORRHAGIA WITH REGULAR CYCLE: Primary | ICD-10-CM

## 2022-07-25 DIAGNOSIS — N92.1 METRORRHAGIA: ICD-10-CM

## 2022-07-25 PROCEDURE — 76856 US EXAM PELVIC COMPLETE: CPT

## 2022-07-25 RX ORDER — NORGESTIMATE AND ETHINYL ESTRADIOL 0.25-0.035
KIT ORAL
Qty: 3 DOSE PACK | Refills: 4 | Status: SHIPPED | OUTPATIENT
Start: 2022-07-25

## 2022-07-27 PROBLEM — F90.9 ADHD (ATTENTION DEFICIT HYPERACTIVITY DISORDER): Status: ACTIVE | Noted: 2022-05-20

## 2022-07-27 PROBLEM — N94.89 ADNEXAL MASS: Status: ACTIVE | Noted: 2022-07-27

## 2022-07-27 PROBLEM — H91.92 HEARING LOSS OF LEFT EAR: Status: ACTIVE | Noted: 2022-05-20

## 2022-07-27 NOTE — PROGRESS NOTES
Pls help her with scheduling f/up appt to review ultrasound results. Let her know we see multiple fibroids measuring up to 11.7cm. She also has an 8.1cm left sided cyst mere or on the ovary that we will discuss further at visit.

## 2022-07-29 ENCOUNTER — CLINICAL SUPPORT (OUTPATIENT)
Dept: SURGERY | Age: 32
End: 2022-07-29

## 2022-07-29 VITALS
RESPIRATION RATE: 18 BRPM | SYSTOLIC BLOOD PRESSURE: 134 MMHG | WEIGHT: 289.5 LBS | BODY MASS INDEX: 51.29 KG/M2 | OXYGEN SATURATION: 95 % | DIASTOLIC BLOOD PRESSURE: 89 MMHG | HEART RATE: 79 BPM | HEIGHT: 63 IN | TEMPERATURE: 98.2 F

## 2022-07-29 DIAGNOSIS — G47.8 UNREFRESHED BY SLEEP: ICD-10-CM

## 2022-07-29 DIAGNOSIS — E66.01 OBESITY, MORBID, BMI 50 OR HIGHER (HCC): Primary | ICD-10-CM

## 2022-07-29 DIAGNOSIS — I10 ESSENTIAL HYPERTENSION: ICD-10-CM

## 2022-07-29 NOTE — PROGRESS NOTES
Weight Management. 1. Have you been to the ER, urgent care clinic since your last visit? Hospitalized since your last visit? No    2. Have you seen or consulted any other health care providers outside of the [address] [place] [address] since your last visit? Include any pap smears or colon screening. No      Progress Note: Weekly Education Class in the [facility] Weight Loss Program         Patient is on Very Low Calorie Diet [x] (4 meal replacements per day, 800 kcal/day)      Low Calorie Diet [] (2-3 meal replacements per day, 7250-0214 kcal/day)    1) Did patient have any new symptoms or physical problems? Yes [x]    No []    If yes, check & comment: weakness [x], fatigue [], lightheadedness [x], headache [], cramps [x], cold intolerance [], hair loss [], diarrhea [], constipation [],  NA [] other: nausea from new  medication                                2) Has patient had any medical attention from other providers, urgent care or the emergency room this week? Yes [x]  No []       NA [], If yes, why: ultrasound for  fibroids                                      3) Any other sugar sweetened beverages consumed this week? Yes [x]  No []    4) Did patient have any problems adhering to the diet? Yes [x]  No [] NA []    If yes, Vacation [], Celebrations [x], Conferences [], Family Reunions [] other: family gathering                                               5) How many hours of sleep this week? 3.75-8    (range)  NA []    Number of meal replacements consumed daily? 1-3  (range)  NA []    Average ounces of water patient consumed daily this week (not including shakes)? 72     (divide the weekly total by 7)    Did you eat any food outside of the program? Yes [] No [x]    Physical Activity Over the Past Week:    Cardio exercise: 0 min  Strength exercise: 0 workouts / week  Number of steps walked per day: 1827-9762    How has patient mood overall been this week?  Sad [], Happy [], Stressed [], Tired [], Content [], NA [], other NOT ANSWERED             Medications reconciled by nurse Yes [x]  No[]    Patient was given therapeutic recommendations for any noted side effects of their dietary approach based upon New Direction patient manual per providers recommendation.

## 2022-07-29 NOTE — PROGRESS NOTES
Spoke with patient advised that follow-up appointment needed to discuss the results of her ultrasound. Advised that she has multiple fibroids measuring up to 11.7 cm. She also has an 8.1 cm left sided cyst mere or on the ovary that will be discussed further at her visit. Appt made.

## 2022-08-01 NOTE — PROGRESS NOTES
Nurse note from patient's weekly VLCD / class was reviewed. Pertinent medical concerns were:   reviewed     BP Readings from Last 3 Encounters:   07/29/22 134/89   07/22/22 (!) 150/96   07/13/22 132/84       Weight Metrics 7/29/2022 7/22/2022 7/13/2022 7/6/2022 7/5/2022 7/5/2022 6/29/2022   Weight 289 lb 8 oz 293 lb 11.2 oz 295 lb 14.4 oz 298 lb 3.2 oz - 295 lb 8 oz 296 lb 6.4 oz   Neck Circ (inches) - - - - 18.25 - -   Waist Measure Inches - - - - 52.25 - -   Body Fat % - - - - 48.6 - -   BMI 51.28 kg/m2 52.03 kg/m2 52.42 kg/m2 52.82 kg/m2 - 52.35 kg/m2 52.5 kg/m2       Current Outpatient Medications   Medication Sig Dispense Refill    norgestimate-ethinyl estradioL (ORTHO-CYCLEN, SPRINTEC) 0.25-35 mg-mcg tab Take one tablet by mouth daily. Skip the placebo pills. On the 12th week, take the placebo pills to allow for menses. 3 Dose Pack 4    buPROPion SR (WELLBUTRIN SR) 100 mg SR tablet Take 1 Tablet by mouth two (2) times a day. 60 Tablet 2    valsartan (DIOVAN) 80 mg tablet Take 1 Tablet by mouth daily. 90 Tablet 1    hydrocortisone (HYTONE) 2.5 % topical cream       amLODIPine (NORVASC) 5 mg tablet TAKE 1 TABLET BY MOUTH EVERY DAY 90 Tablet 1    cholecalciferol (VITAMIN D3) (5000 Units/125 mcg) tab tablet Take  by mouth daily. cyanocobalamin 1,000 mcg tablet Take 1,000 mcg by mouth daily. magnesium oxide (MAG-OX) 400 mg tablet Take 400 mg by mouth daily. Omega-3 Fatty Acids 60- mg cpDR Take  by mouth. zinc 50 mg tab tablet Take  by mouth daily. ferrous sulfate 325 mg (65 mg iron) tablet Take  by mouth Daily (before breakfast). lysine 1,000 mg tab Take  by mouth.

## 2022-08-01 NOTE — PROGRESS NOTES
Nurse note from patient's weekly VLCD  class was reviewed. Pertinent medical concerns were:   reviewed     BP Readings from Last 3 Encounters:   07/29/22 134/89   07/22/22 (!) 150/96   07/13/22 132/84       Weight Metrics 7/29/2022 7/22/2022 7/13/2022 7/6/2022 7/5/2022 7/5/2022 6/29/2022   Weight 289 lb 8 oz 293 lb 11.2 oz 295 lb 14.4 oz 298 lb 3.2 oz - 295 lb 8 oz 296 lb 6.4 oz   Neck Circ (inches) - - - - 18.25 - -   Waist Measure Inches - - - - 52.25 - -   Body Fat % - - - - 48.6 - -   BMI 51.28 kg/m2 52.03 kg/m2 52.42 kg/m2 52.82 kg/m2 - 52.35 kg/m2 52.5 kg/m2       Current Outpatient Medications   Medication Sig Dispense Refill    valsartan (DIOVAN) 80 mg tablet Take 1 Tablet by mouth daily. 90 Tablet 1    hydrocortisone (HYTONE) 2.5 % topical cream       amLODIPine (NORVASC) 5 mg tablet TAKE 1 TABLET BY MOUTH EVERY DAY 90 Tablet 1    cholecalciferol (VITAMIN D3) (5000 Units/125 mcg) tab tablet Take  by mouth daily. cyanocobalamin 1,000 mcg tablet Take 1,000 mcg by mouth daily. magnesium oxide (MAG-OX) 400 mg tablet Take 400 mg by mouth daily. Omega-3 Fatty Acids 60- mg cpDR Take  by mouth. zinc 50 mg tab tablet Take  by mouth daily. ferrous sulfate 325 mg (65 mg iron) tablet Take  by mouth Daily (before breakfast). lysine 1,000 mg tab Take  by mouth. norgestimate-ethinyl estradioL (ORTHO-CYCLEN, SPRINTEC) 0.25-35 mg-mcg tab Take one tablet by mouth daily. Skip the placebo pills. On the 12th week, take the placebo pills to allow for menses. 3 Dose Pack 4    buPROPion SR (WELLBUTRIN SR) 100 mg SR tablet Take 1 Tablet by mouth two (2) times a day.  60 Tablet 2

## 2022-08-01 NOTE — PROGRESS NOTES
Nurse note from patient's weekly VLCD class was reviewed. Pertinent medical concerns were:   reviewed     BP Readings from Last 3 Encounters:   07/29/22 134/89   07/22/22 (!) 150/96   07/13/22 132/84       Weight Metrics 7/29/2022 7/22/2022 7/13/2022 7/6/2022 7/5/2022 7/5/2022 6/29/2022   Weight 289 lb 8 oz 293 lb 11.2 oz 295 lb 14.4 oz 298 lb 3.2 oz - 295 lb 8 oz 296 lb 6.4 oz   Neck Circ (inches) - - - - 18.25 - -   Waist Measure Inches - - - - 52.25 - -   Body Fat % - - - - 48.6 - -   BMI 51.28 kg/m2 52.03 kg/m2 52.42 kg/m2 52.82 kg/m2 - 52.35 kg/m2 52.5 kg/m2       Current Outpatient Medications   Medication Sig Dispense Refill    buPROPion SR (WELLBUTRIN SR) 100 mg SR tablet Take 1 Tablet by mouth two (2) times a day. 60 Tablet 2    valsartan (DIOVAN) 80 mg tablet Take 1 Tablet by mouth daily. 90 Tablet 1    hydrocortisone (HYTONE) 2.5 % topical cream       amLODIPine (NORVASC) 5 mg tablet TAKE 1 TABLET BY MOUTH EVERY DAY 90 Tablet 1    cholecalciferol (VITAMIN D3) (5000 Units/125 mcg) tab tablet Take  by mouth daily. cyanocobalamin 1,000 mcg tablet Take 1,000 mcg by mouth daily. Omega-3 Fatty Acids 60- mg cpDR Take  by mouth. zinc 50 mg tab tablet Take  by mouth daily. ferrous sulfate 325 mg (65 mg iron) tablet Take  by mouth Daily (before breakfast). lysine 1,000 mg tab Take  by mouth. norgestimate-ethinyl estradioL (ORTHO-CYCLEN, SPRINTEC) 0.25-35 mg-mcg tab Take one tablet by mouth daily. Skip the placebo pills. On the 12th week, take the placebo pills to allow for menses. 3 Dose Pack 4    magnesium oxide (MAG-OX) 400 mg tablet Take 400 mg by mouth daily.

## 2022-08-02 ENCOUNTER — CLINICAL SUPPORT (OUTPATIENT)
Dept: SURGERY | Age: 32
End: 2022-08-02

## 2022-08-02 DIAGNOSIS — E66.01 OBESITY, MORBID, BMI 50 OR HIGHER (HCC): Primary | ICD-10-CM

## 2022-08-02 NOTE — PROGRESS NOTES
[facility]  Metabolic Program Follow-up Nutrition Consult    Date: 2022   Physician: Gerard Morrissey MD  Name: Qiana James  :  1990    Type of Plan: VLCD  Weeks on Plan: 2 months  Virtual visit was completed through [employer]. ASSESSMENT:    Medications/Supplements:   Prior to Admission medications    Medication Sig Start Date End Date Taking? Authorizing Provider   norgestimate-ethinyl estradioL (ORTHO-CYCLEN, [address]) 0.25-35 mg-mcg tab Take one tablet by mouth daily. Skip the placebo pills. On the 12th week, take the placebo pills to allow for menses. 22   Phil Pa MD   buPROPion SR [facility] SR) 100 mg SR tablet Take 1 Tablet by mouth two (2) times a day. 22   Ángela Archibald MD   valsartan (DIOVAN) 80 mg tablet Take 1 Tablet by mouth daily. 22   Ángela Archibald MD   hydrocortisone (HYTONE) 2.5 % topical cream  22   Provider, Historical   amLODIPine (NORVASC) 5 mg tablet TAKE 1 TABLET BY MOUTH EVERY DAY 22   Ángela Archibald MD   cholecalciferol (VITAMIN D3) (5000 Units/125 mcg) tab tablet Take  by mouth daily. Provider, Historical   cyanocobalamin 1,000 mcg tablet Take 1,000 mcg by mouth daily. Provider, Historical   magnesium oxide (MAG-OX) 400 mg tablet Take 400 mg by mouth daily. Provider, Historical   Omega-3 Fatty Acids 60- mg cpDR Take  by mouth. Provider, Historical   zinc 50 mg tab tablet Take  by mouth daily. Provider, Historical   ferrous sulfate 325 mg (65 mg iron) tablet Take  by mouth Daily (before breakfast). Provider, Historical   lysine 1,000 mg tab Take  by mouth. Provider, Historical              Starting Weight: 311#   Current Weight: 289# (295# last visit)  Overall Pounds Lost: 22#  Overall Pounds Gained: 0  Goals: 260#, where she was 3-4 years ago.   200# next goal.    Exercise/Physical Activity:not reported    Is patient using [employer] products: yes  If yes, how many per day: 2    Aversions/side effects of product/program:none    Fluids used to mix with products:water    Reported Diet History: Wellbutrin started two weeks ago, nauseous, d/c the shakes until felt better. Taste buds have changed, can't handle sweets. Snacks: Bell peppers, hummus, meat and side salad, boiled eggs    Using myfitness pal and connects to watch, tracking calories 800-1200    Beverages: crystal light, sparkling water, no sodas. 64 ounces minimum. - 84 ounces    24 Hour Diet Recall  Breakfast  ND shake   Lunch  ND shake   Dinner  Boiled eggs   Snacks  None    Beverages  water     Barriers/concerns preventing patient from achieving goal(s) since last visit:Covid since last visit, down and out for two weeks. NUTRITION INTERVENTION:  Pt educated on nutrition recommendations for the [employer] Very Low Calorie Plan (VLCD), with the specific meal pattern of 4 meal replacements every day totaling 800kcals/every day and 40-50 grams carbohydrates/every day. Choose lean protein and non-starchy vegetable OR an additional meal replacement on days of increased activity/exercise to prevent injury, dizziness/lightheadedness, or delay in healing/repairing after exercise. Consult provider and RD prior to increase in exercise routine. IF choosing grocery meal/snack: Read all nutrition labels. Demonstrated and emphasized identifying serving size, total fat, sugar and protein content. Defined low fat as </= 3 g per serving. Discussed lean and extra lean sources of protein. Avoid foods with sugar listed in the first 3 ingredients and >/10 g sugar per serving. Consume meal replacements every three to four hours or pattern as discussed with provider. Mix with water. May add herbs/spices for taste. Refer to recipe book for meal replacements ideas to alter taste, texture, and temperature while keeping macronutrient distribution within program guidelines.      Practice mindful eating habits; take small bites, chew thoroughly, avoid distractions, utilize hunger/fullness scale. Reviewed attendance policy of attending weekly nutrition classes. Metabolic support group recommended, and increase physical activity (approved per MD) for long term weight maintenance. NUTRITION MONITORING AND EVALUATION:  22# loss x 2 months. Meeting goals, no changes in care at this time. Followup PRN. The following goals were established with patient;  1) bone broth for nausea for added protein  2) dark leafy greens to shakes PRN ok to do  3) IF doing fruit, pair with lean protein and healthy fat, but consider no fruit during VLCD stage  4) 150 minutes week exercise as first goal      Specific tips and techniques to facilitate compliance with above recommendations were provided and discussed. If further details are desired please contact me at [phone]. This phone number was also provided to the patient for any further questions or concerns.           Melissa Kunz, MS, RD, LDN

## 2022-08-05 ENCOUNTER — CLINICAL SUPPORT (OUTPATIENT)
Dept: SURGERY | Age: 32
End: 2022-08-05

## 2022-08-05 VITALS — HEIGHT: 63 IN | RESPIRATION RATE: 18 BRPM | BODY MASS INDEX: 51.28 KG/M2

## 2022-08-05 DIAGNOSIS — E66.01 OBESITY, MORBID, BMI 50 OR HIGHER (HCC): Primary | ICD-10-CM

## 2022-08-05 DIAGNOSIS — I10 ESSENTIAL HYPERTENSION: ICD-10-CM

## 2022-08-05 DIAGNOSIS — G47.8 UNREFRESHED BY SLEEP: ICD-10-CM

## 2022-08-05 NOTE — PROGRESS NOTES
Weight Management. 1. Have you been to the ER, urgent care clinic since your last visit? Hospitalized since your last visit? No    2. Have you seen or consulted any other health care providers outside of the [address] [place] [address] since your last visit? Include any pap smears or colon screening. No      Progress Note: Weekly Education Class in the [facility] Weight Loss Program         Patient is on Very Low Calorie Diet [x] (4 meal replacements per day, 800 kcal/day)      Low Calorie Diet [] (2-3 meal replacements per day, 2571-0499 kcal/day)    1) Did patient have any new symptoms or physical problems? Yes []    No [x]    If yes, check & comment: weakness [], fatigue [], lightheadedness [], headache [], cramps [], cold intolerance [], hair loss [], diarrhea [], constipation [],  NA [] other:                                 2) Has patient had any medical attention from other providers, urgent care or the emergency room this week? Yes []  No [x]       NA [], If yes, why:                                       3) Any other sugar sweetened beverages consumed this week? Yes [x]  No []    4) Did patient have any problems adhering to the diet? Yes [x]  No [] NA []    If yes, Vacation [], Celebrations [], Conferences [], Family Reunions [] other: Nausea from new medicine                                               5) How many hours of sleep this week? 3.25-7.75    (range)  NA []    Number of meal replacements consumed daily? 1-2  (range)  NA []    Average ounces of water patient consumed daily this week (not including shakes)? (divide the weekly total by 7)NOT ANSWERED      Did you eat any food outside of the program? Yes [] No []NOT ANSWERED      Physical Activity Over the Past Week:    Cardio exercise: 0 min  Strength exercise: 0 workouts / week  Number of steps walked per day: 6708-5060    How has patient mood overall been this week?  Sad [], Happy [], Stressed [], Tired [], Content [], NA [], other  NOT ANSWERED             Medications reconciled by nurse Yes [x]  No[]    Patient was given therapeutic recommendations for any noted side effects of their dietary approach based upon [facility] patient manual per providers recommendation.

## 2022-08-10 ENCOUNTER — OFFICE VISIT (OUTPATIENT)
Dept: SURGERY | Age: 32
End: 2022-08-10
Payer: MEDICAID

## 2022-08-10 VITALS
OXYGEN SATURATION: 98 % | BODY MASS INDEX: 51.37 KG/M2 | SYSTOLIC BLOOD PRESSURE: 124 MMHG | HEART RATE: 82 BPM | WEIGHT: 289.9 LBS | RESPIRATION RATE: 20 BRPM | DIASTOLIC BLOOD PRESSURE: 84 MMHG | HEIGHT: 63 IN

## 2022-08-10 DIAGNOSIS — E66.01 OBESITY, MORBID, BMI 50 OR HIGHER (HCC): Primary | ICD-10-CM

## 2022-08-10 DIAGNOSIS — G47.8 UNREFRESHED BY SLEEP: ICD-10-CM

## 2022-08-10 DIAGNOSIS — I10 ESSENTIAL HYPERTENSION: ICD-10-CM

## 2022-08-10 PROCEDURE — 99214 OFFICE O/P EST MOD 30 MIN: CPT | Performed by: FAMILY MEDICINE

## 2022-08-10 NOTE — PROGRESS NOTES
[facility] Progress Note:   F/up Physician Visit    CC: Weight Management      Fabian Grace is a 27 y.o. female who is here for her  f/up physician visit for the VLCD Program.  Several times she has regained a few pounds but is overall on a downward trean   Started 314 and now 289      Weight Metrics 8/10/2022 8/10/2022 8/5/2022 7/29/2022 7/22/2022 7/13/2022 7/6/2022   Weight - 289 lb 14.4 oz - 289 lb 8 oz 293 lb 11.2 oz 295 lb 14.4 oz 298 lb 3.2 oz   Neck Circ (inches) 17 - - - - - -   Waist Measure Inches 54 - - - - - -   Body Fat % 48.3 - - - - - -   BMI - 51.35 kg/m2 51.28 kg/m2 51.28 kg/m2 52.03 kg/m2 52.42 kg/m2 52.82 kg/m2               Participation   Did you attend clinic and class last week? no    Review of Systems  Since your last visit, have you experienced any complications? no  If yes, please list:       Are you taking an appetite suppressant? yes  If so, is there any Chest Pain, Palpitations or Dizziness? HUNGER CONTROL: good    BP Readings from Last 3 Encounters:   08/10/22 124/84   08/05/22 (P) 126/86   07/29/22 134/89       SLEEP:7 day avg 6 hrs 45 min  Study scheduled 8/29  Her watch shows her O2 drops to 81 %      Have you received any other medical care this week? no  If yes, where and for what? Have you discontinued or changed any medicine or dose of your medicine since your last visit with Dr Willian Weaver? no  If yes, where and for what? Diet  How many ounces of calorie-free liquids did you consume each day? At least 64 oz    How many meal replacements did you take each day? 3-4    Did you have any problems adhering to the program?  no If yes, please explain:      Exercise  Aerobic exercise: 2-3 times a week goes kick [employer] and on sundays she goes to [employer] and does a lot of walking and dancing around     Resistance exercise:    workouts / week  Any discomfort? If yes, where?       Objective  Visit Vitals  /84 (BP 1 Location: Right upper arm, BP Patient Position: Sitting)   Pulse 82   Resp 20   Ht 5' 3\" (1.6 m)   Wt 289 lb 14.4 oz (131.5 kg)   LMP 08/06/2022 (Exact Date)   SpO2 98%   BMI 51.35 kg/m²     Patient's last menstrual period was 08/06/2022 (exact date). Physical Exam  Appearance: no,   Mental:A&O x 3, NAD  H:NC/AT,  EENT:   EOMI, PERRL, No scleral icterus  Neck: no bruit or JVD  Lung: clear, No W/R  ABD: soft, active, nontender  Ext:  no Edema  Neuro: nonfocal  Assessment / Plan    Encounter Diagnoses   Name Primary? Obesity, morbid, BMI 50 or higher ([address] Yes    Essential hypertension     Unrefreshed by sleep      Diagnoses and all orders for this visit:    1. Obesity, morbid, BMI 50 or higher (HCC)  Cont the exercise   Cont wellbutrin 100 mg sr  2. Essential hypertension    Bp normal on current meds  Cont valsartan 80 mg ea day and amlodipine 5 mg  3. Unrefreshed by sleep  Get the sleep study done  1. Weight management improved   Progress was reviewed with patient    2. Labs    Latest results reviewed with patient       face to face time with Scooter Reinabrody consisted of counseling & coordinating and/or discussing treatment plans in reference to her obesity The primary encounter diagnosis was Obesity, morbid, BMI 50 or higher ([address] Diagnoses of Essential hypertension and Unrefreshed by sleep were also pertinent to this visit.

## 2022-08-10 NOTE — PROGRESS NOTES
1. Have you been to the ER, urgent care clinic since your last visit? Hospitalized since your last visit? No    2. Have you seen or consulted any other health care providers outside of the [address] [place] [address] since your last visit? Include any pap smears or colon screening.  No

## 2022-08-19 ENCOUNTER — OFFICE VISIT (OUTPATIENT)
Dept: OBGYN CLINIC | Age: 32
End: 2022-08-19
Payer: MEDICAID

## 2022-08-19 ENCOUNTER — CLINICAL SUPPORT (OUTPATIENT)
Dept: SURGERY | Age: 32
End: 2022-08-19

## 2022-08-19 VITALS
HEIGHT: 63 IN | HEART RATE: 75 BPM | OXYGEN SATURATION: 97 % | SYSTOLIC BLOOD PRESSURE: 154 MMHG | RESPIRATION RATE: 20 BRPM | WEIGHT: 289.7 LBS | BODY MASS INDEX: 51.33 KG/M2 | TEMPERATURE: 97.3 F | DIASTOLIC BLOOD PRESSURE: 97 MMHG

## 2022-08-19 VITALS
WEIGHT: 287 LBS | TEMPERATURE: 98 F | HEIGHT: 63 IN | DIASTOLIC BLOOD PRESSURE: 80 MMHG | RESPIRATION RATE: 20 BRPM | HEART RATE: 76 BPM | BODY MASS INDEX: 50.85 KG/M2 | OXYGEN SATURATION: 98 % | SYSTOLIC BLOOD PRESSURE: 140 MMHG

## 2022-08-19 DIAGNOSIS — N94.89 ADNEXAL MASS: Primary | ICD-10-CM

## 2022-08-19 DIAGNOSIS — N94.6 DYSMENORRHEA: ICD-10-CM

## 2022-08-19 DIAGNOSIS — N92.0 MENORRHAGIA WITH REGULAR CYCLE: ICD-10-CM

## 2022-08-19 DIAGNOSIS — I10 ESSENTIAL HYPERTENSION: ICD-10-CM

## 2022-08-19 DIAGNOSIS — D21.9 FIBROIDS: ICD-10-CM

## 2022-08-19 DIAGNOSIS — E66.01 OBESITY, MORBID, BMI 50 OR HIGHER (HCC): Primary | ICD-10-CM

## 2022-08-19 PROCEDURE — 99213 OFFICE O/P EST LOW 20 MIN: CPT | Performed by: OBSTETRICS & GYNECOLOGY

## 2022-08-19 NOTE — PROGRESS NOTES
1. Have you been to the ER, urgent care clinic since your last visit? Hospitalized since your last visit? No    2. Have you seen or consulted any other health care providers outside of the [address] [place] [address] since your last visit? Include any pap smears or colon screening. No    Bp elevated. Pt reports that she  has been a little stressed but getting better. She is taking her Bp medications.

## 2022-08-19 NOTE — PROGRESS NOTES
Patient attended triage but did not bring homework form. Patient instructed to email or fax completed homework form to us. Patient informed that not bringing the homework form can result in not being seen next time.

## 2022-08-19 NOTE — PROGRESS NOTES
1. Have you been to the ER, urgent care clinic since your last visit? Hospitalized since your last visit? No    2. Have you seen or consulted any other health care providers outside of the [address] [place] [address] since your last visit? Include any pap smears or colon screening.  No    Visit Vitals  BP (!) 154/97 (BP 1 Location: Right upper arm, BP Patient Position: Sitting, BP Cuff Size: Large adult)   Pulse 75   Temp 97.3 °F (36.3 °C) (Temporal)   Resp 20   Ht 5' 3\" (1.6 m)   Wt 289 lb 11.2 oz (131.4 kg)   LMP 08/06/2022 (Exact Date)   SpO2 97%   BMI 51.32 kg/m²       Chief Complaint   Patient presents with    Follow-up     Review US results

## 2022-08-19 NOTE — PROGRESS NOTES
Tejas Asencio is a 27 y.o. female [de-identified], lmp 8/6/22, who presents today for the following:  Chief Complaint   Patient presents with    Follow-up     Review US results      She was initially seen by me on 6/27/22 for heavy menses. She recently started birth control pills in May 2022 by PCP for heavy menses. CBC and TSH wnl in May 2022 (in chart). Known h/o uterine fibroids. EXAM:  US PELV NON OBS (7/25/22): INDICATION:  Heavy menses. History of ovarian cyst and fibroid. COMPARISON:  None. TECHNIQUE: Transabdominal ultrasound of the female pelvis. FINDINGS:   Uterus: measures 17.2 x 10.7 x 9.8 cm, which is enlarged. Fibroids measure 11.7  x 10.2 x 8.6 cm and 8.1 x 6.9 x 6.6 cm. Endometrium: Not well seen due to uterine fibroids and bowel gas. The right ovary is not seen due to bowel gas and enlarged fibroid uterus. A predominantly cystic left adnexal mass measures 7.2 x 7.6 x 8.1 cm. It  contains an echogenic shadowing component measuring 4.8 x 3.4 x 4.7 cm. Blood  flow is demonstrated at the periphery. Free fluid: None. IMPRESSION  1. Enlarged fibroid uterus with fibroids measuring up to 11.7 cm.     2. Left adnexal cystic mass measuring 8.1 cm with an echogenic shadowing  component measuring 4.8 cm that likely represents a dermoid. Review of Systems   Constitutional:  Negative for chills and fever. Respiratory:  Negative for shortness of breath. Cardiovascular:  Negative for chest pain. Gastrointestinal:  Negative for abdominal pain, constipation, diarrhea, nausea and vomiting. Genitourinary:  Negative for dysuria. Psychiatric/Behavioral:  Positive for depression. The patient is not nervous/anxious. She states she had SI last week but not this week. Has psychiatrist and therapy. Declines going to ER now.      Past Medical History:   Diagnosis Date    Adnexal mass 7/27/2022    8.1 cm left adnexal cystic mass    Depression     Hypertension        No Known Allergies Current Outpatient Medications   Medication Sig    norgestimate-ethinyl estradioL (ORTHO-CYCLEN, SPRINTEC) 0.25-35 mg-mcg tab Take one tablet by mouth daily. Skip the placebo pills. On the 12th week, take the placebo pills to allow for menses. buPROPion SR (WELLBUTRIN SR) 100 mg SR tablet Take 1 Tablet by mouth two (2) times a day. valsartan (DIOVAN) 80 mg tablet Take 1 Tablet by mouth daily. hydrocortisone (HYTONE) 2.5 % topical cream     amLODIPine (NORVASC) 5 mg tablet TAKE 1 TABLET BY MOUTH EVERY DAY    cholecalciferol (VITAMIN D3) (5000 Units/125 mcg) tab tablet Take  by mouth daily. cyanocobalamin 1,000 mcg tablet Take 1,000 mcg by mouth daily. magnesium oxide (MAG-OX) 400 mg tablet Take 400 mg by mouth daily. Omega-3 Fatty Acids 60- mg cpDR Take  by mouth. zinc 50 mg tab tablet Take  by mouth daily. ferrous sulfate 325 mg (65 mg iron) tablet Take  by mouth Daily (before breakfast). lysine 1,000 mg tab Take  by mouth. No current facility-administered medications for this visit. BP (!) 154/97 (BP 1 Location: Right upper arm, BP Patient Position: Sitting, BP Cuff Size: Large adult)   Pulse 75   Temp 97.3 °F (36.3 °C) (Temporal)   Resp 20   Ht 5' 3\" (1.6 m)   Wt 289 lb 11.2 oz (131.4 kg)   LMP 08/06/2022 (Exact Date)   SpO2 97%   BMI 51.32 kg/m²    Physical Exam  Constitutional:       Appearance: Normal appearance. She is obese. Genitourinary:      Genitourinary Comments: deferred     HENT:      Head: Normocephalic and atraumatic. Eyes:      Extraocular Movements: Extraocular movements intact. Pulmonary:      Effort: Pulmonary effort is normal.   Musculoskeletal:      Cervical back: Normal range of motion. Neurological:      Mental Status: She is alert and oriented to person, place, and time. Skin:     General: Skin is warm and dry. Psychiatric:         Mood and Affect: Mood normal.         Behavior: Behavior normal.   Vitals reviewed. Assessment/plan:     ICD-10-CM ICD-9-CM    1. Adnexal mass  N94.89 625.8       2. Menorrhagia with regular cycle  N92.0 626.2       3. Fibroids  D21.9 215.9       4. Dysmenorrhea  N94.6 625.3          Discussed management options: surgical management (myomectomy and ovarian cystectomy) vs Lupron. She wants to think about it. She will continue COCPs in the meantime.

## 2022-08-26 ENCOUNTER — CLINICAL SUPPORT (OUTPATIENT)
Dept: SURGERY | Age: 32
End: 2022-08-26

## 2022-08-26 VITALS
SYSTOLIC BLOOD PRESSURE: 133 MMHG | BODY MASS INDEX: 50.38 KG/M2 | WEIGHT: 284.3 LBS | RESPIRATION RATE: 18 BRPM | OXYGEN SATURATION: 97 % | TEMPERATURE: 98.2 F | HEIGHT: 63 IN | HEART RATE: 74 BPM | DIASTOLIC BLOOD PRESSURE: 81 MMHG

## 2022-08-26 DIAGNOSIS — I10 ESSENTIAL HYPERTENSION: ICD-10-CM

## 2022-08-26 DIAGNOSIS — E66.01 OBESITY, MORBID, BMI 50 OR HIGHER (HCC): Primary | ICD-10-CM

## 2022-08-26 NOTE — PROGRESS NOTES
Progress Note: Weekly Education Class in the [facility] Weight Loss Program         Patient is on Very Low Calorie Diet [x] (4 meal replacements per day, 800 kcal/day)      Low Calorie Diet [] (2-3 meal replacements per day, 1625-4922 kcal/day)    1) Did patient have any new symptoms or physical problems? Yes []    No     If yes, check & comment: weakness [], fatigue [], lightheadedness [], headache [], cramps [], cold intolerance [], hair loss [], diarrhea [], constipation [],  NA [] other:                                 2) Has patient had any medical attention from other providers, urgent care or the emergency room this week? No        NA [], If yes, why:                                       3) Any other sugar sweetened beverages consumed this week? Yes [x]  No []    4) Did patient have any problems adhering to the diet? Yes [x]  No [] NA []    If yes, Vacation [], Celebrations [], Conferences [], Family Reunions [x] other:                                                5) How many hours of sleep this week? 6-7.25    (range)  NA []    Number of meal replacements consumed daily? 0-2 (range)  NA []    Average ounces of water patient consumed daily this week (not including shakes)? 55  (divide the weekly total by 7)    Did you eat any food outside of the program? Yes [] No [x]    Physical Activity Over the Past Week:    Cardio exercise: 0 min  Strength exercise: 0 workouts / week  Number of steps walked per day: 5419-1050    How has patient mood overall been this week? Sad [], Happy [], Stressed [], Tired [], Content [], NA [], other   No coment         Medications reconciled by nurse Yes [x]  No[]    Patient was given therapeutic recommendations for any noted side effects of their dietary approach based upon [facility] patient manual per providers recommendation.

## 2022-08-26 NOTE — PROGRESS NOTES
1. Have you been to the ER, urgent care clinic since your last visit? Hospitalized since your last visit? No    2. Have you seen or consulted any other health care providers outside of the [address] [place] [address] since your last visit? Include any pap smears or colon screening.  No

## 2022-08-27 NOTE — PROGRESS NOTES
Nurse note from patient's weekly VLCD  Maintenance class was reviewed. Pertinent medical concerns were:   reviewed     BP Readings from Last 3 Encounters:   08/26/22 133/81   08/19/22 (!) 154/97   08/19/22 (!) 140/80       Weight Metrics 8/26/2022 8/19/2022 8/19/2022 8/10/2022 8/10/2022 8/5/2022 7/29/2022   Weight 284 lb 4.8 oz 287 lb 289 lb 11.2 oz - 289 lb 14.4 oz - 289 lb 8 oz   Neck Circ (inches) - - - 17 - - -   Waist Measure Inches - - - 54 - - -   Body Fat % - - - 48.3 - - -   BMI 50.36 kg/m2 50.84 kg/m2 51.32 kg/m2 - 51.35 kg/m2 51.28 kg/m2 51.28 kg/m2       Current Outpatient Medications   Medication Sig Dispense Refill    norgestimate-ethinyl estradioL (ORTHO-CYCLEN, SPRINTEC) 0.25-35 mg-mcg tab Take one tablet by mouth daily. Skip the placebo pills. On the 12th week, take the placebo pills to allow for menses. 3 Dose Pack 4    buPROPion SR (WELLBUTRIN SR) 100 mg SR tablet Take 1 Tablet by mouth two (2) times a day. 60 Tablet 2    valsartan (DIOVAN) 80 mg tablet Take 1 Tablet by mouth daily. 90 Tablet 1    hydrocortisone (HYTONE) 2.5 % topical cream  (Patient not taking: Reported on 8/26/2022)      amLODIPine (NORVASC) 5 mg tablet TAKE 1 TABLET BY MOUTH EVERY DAY 90 Tablet 1    cholecalciferol (VITAMIN D3) (5000 Units/125 mcg) tab tablet Take  by mouth daily. cyanocobalamin 1,000 mcg tablet Take 1,000 mcg by mouth daily. magnesium oxide (MAG-OX) 400 mg tablet Take 400 mg by mouth daily. Omega-3 Fatty Acids 60- mg cpDR Take  by mouth. (Patient not taking: Reported on 8/26/2022)      zinc 50 mg tab tablet Take  by mouth daily. ferrous sulfate 325 mg (65 mg iron) tablet Take  by mouth Daily (before breakfast). lysine 1,000 mg tab Take  by mouth.

## 2022-08-29 ENCOUNTER — OFFICE VISIT (OUTPATIENT)
Dept: SLEEP MEDICINE | Age: 32
End: 2022-08-29

## 2022-08-29 DIAGNOSIS — G47.33 OSA (OBSTRUCTIVE SLEEP APNEA): Primary | ICD-10-CM

## 2022-08-29 NOTE — PROGRESS NOTES
S>Brittney Cuellar is a 27 y.o. female seen today to receive a home sleep testing unit (HST). Patient was educated on proper hookup and operation of the HST via detailed instruction sheet (per COVID-19 precautions)  Instruction forms with after hours contact and documentation were signed. O>    Visit Vitals  LMP 08/06/2022 (Exact Date)         A>  No diagnosis found. P>  General information regarding operations and maintenance of the device was provided. Follow-up appointment was made to return the HST. She will be contacted once the results have been reviewed. She was asked to contact our office for any problems regarding her home sleep test study.

## 2022-08-31 ENCOUNTER — TELEPHONE (OUTPATIENT)
Dept: SLEEP MEDICINE | Age: 32
End: 2022-08-31

## 2022-08-31 DIAGNOSIS — G47.33 OSA (OBSTRUCTIVE SLEEP APNEA): Primary | ICD-10-CM

## 2022-08-31 NOTE — TELEPHONE ENCOUNTER
Results have been sent to  [facility]. Fax DME order & Schedule 1st adherence visit in 60 to 90 days.

## 2022-08-31 NOTE — TELEPHONE ENCOUNTER
Radha Cervantes underwent Sleep Testing which was indicative of an average AHI of 13.6 per hour with an SpO2 cj of 80% and SpO2 of < 88% being 2.4 minutes. An APAP prescription has been written and patient will be contacted by office staff regarding follow-up  in 2-3 months after initiation of therapy. Encounter Diagnosis   Name Primary? ISABEL (obstructive sleep apnea) Yes       Orders Placed This Encounter    AMB SUPPLY ORDER     Diagnosis: Obstructive Sleep Apnea ICD-10 Code (G47.33)    Positive Airway Pressure Therapy: Duration of need: 99 months. APAP Device with Heated Humidifer [id number] / [id number]. Minimum Pressure: 06 cmH2O, Maximum Pressure: 15 cmH2O.     Nasal Pillows Combo Mask (Replace) 2 per month.  Nasal Pillows (Replace) 2 per month.  Full Face Mask 1 every 3 months.  Full Face Mask Cushion 1 per month.  Nasal Cushion (Replace) 2 per month.  Nasal Interface Mask 1 every 3 months.  Headgear 1 every 6 months.  Chinstrap 1 every 6 months.  Tubing 1 every 3 months.  Filter(s) Disposable 2 per month.  Filter(s) Non-Disposable 1 every 6 months. .   [address] [facility] for Humidifier (Replace) 1 every 6 months. Perform Mask Fitting per patient preference and comfort - replace as above. Aloysius Kayser, MD, FAASM; NPI: [id number]  Electronically signed. 08/31/22

## 2022-09-01 ENCOUNTER — DOCUMENTATION ONLY (OUTPATIENT)
Dept: SLEEP MEDICINE | Age: 32
End: 2022-09-01

## 2022-09-01 NOTE — TELEPHONE ENCOUNTER
Faxed pap order to [facility]   Called patient to inform, but unable to LVM  sent a [employer] message -DONE

## 2022-09-02 ENCOUNTER — CLINICAL SUPPORT (OUTPATIENT)
Dept: SURGERY | Age: 32
End: 2022-09-02

## 2022-09-02 VITALS
HEART RATE: 69 BPM | SYSTOLIC BLOOD PRESSURE: 131 MMHG | BODY MASS INDEX: 49.7 KG/M2 | WEIGHT: 280.5 LBS | TEMPERATURE: 98.3 F | HEIGHT: 63 IN | DIASTOLIC BLOOD PRESSURE: 81 MMHG | RESPIRATION RATE: 18 BRPM | OXYGEN SATURATION: 97 %

## 2022-09-02 DIAGNOSIS — G47.8 UNREFRESHED BY SLEEP: ICD-10-CM

## 2022-09-02 DIAGNOSIS — E66.01 OBESITY, MORBID, BMI 50 OR HIGHER (HCC): Primary | ICD-10-CM

## 2022-09-02 DIAGNOSIS — I10 ESSENTIAL HYPERTENSION: ICD-10-CM

## 2022-09-02 NOTE — PROGRESS NOTES
Weight Management. 1. Have you been to the ER, urgent care clinic since your last visit? Hospitalized since your last visit? No    2. Have you seen or consulted any other health care providers outside of the [address] [place] [address] since your last visit? Include any pap smears or colon screening. No      Progress Note: Weekly Education Class in the [facility] Weight Loss Program         Patient is on Very Low Calorie Diet [x] (4 meal replacements per day, 800 kcal/day)      Low Calorie Diet [] (2-3 meal replacements per day, 7073-9059 kcal/day)    1) Did patient have any new symptoms or physical problems? Yes []    No [x]    If yes, check & comment: weakness [], fatigue [], lightheadedness [], headache [], cramps [], cold intolerance [], hair loss [], diarrhea [], constipation [],  NA [] other:                                 2) Has patient had any medical attention from other providers, urgent care or the emergency room this week? Yes []  No [x]       NA [], If yes, why:                                       3) Any other sugar sweetened beverages consumed this week? Yes [x]  No []    4) Did patient have any problems adhering to the diet? Yes [x]  No [] NA []    If yes, Vacation [], Celebrations [], Conferences [], Family Reunions [] other: I can't stand the taste of them  anymore. Everything is too sweet  or the aftertaste turns my stomach. 5) How many hours of sleep this week? 5-9    (range)  NA []    Number of meal replacements consumed daily? 1  (range)  NA []    Average ounces of water patient consumed daily this week (not including shakes)? 56     (divide the weekly total by 7)    Did you eat any food outside of the program? Yes [] No [x]    Physical Activity Over the Past Week:    Cardio exercise: 0 min  Strength exercise: 0 workouts / week  Number of steps walked per day: 1452-3655    How has patient mood overall been this week?  Sad [], Happy [], Stressed [x], Tired [], Content [], NA [], other            Medications reconciled by nurse Yes [x]  No[]    Patient was given therapeutic recommendations for any noted side effects of their dietary approach based upon [facility] patient manual per providers recommendation.

## 2022-09-02 NOTE — PROGRESS NOTES
Nurse note from patient's weekly VLCD / class was reviewed. Pertinent medical concerns were:   reviewed     BP Readings from Last 3 Encounters:   08/26/22 133/81   08/19/22 (!) 154/97   08/19/22 (!) 140/80       Weight Metrics 8/26/2022 8/19/2022 8/19/2022 8/10/2022 8/10/2022 8/5/2022 7/29/2022   Weight 284 lb 4.8 oz 287 lb 289 lb 11.2 oz - 289 lb 14.4 oz - 289 lb 8 oz   Neck Circ (inches) - - - 17 - - -   Waist Measure Inches - - - 54 - - -   Body Fat % - - - 48.3 - - -   BMI 50.36 kg/m2 50.84 kg/m2 51.32 kg/m2 - 51.35 kg/m2 51.28 kg/m2 51.28 kg/m2       Current Outpatient Medications   Medication Sig Dispense Refill    norgestimate-ethinyl estradioL (ORTHO-CYCLEN, SPRINTEC) 0.25-35 mg-mcg tab Take one tablet by mouth daily. Skip the placebo pills. On the 12th week, take the placebo pills to allow for menses. 3 Dose Pack 4    buPROPion SR (WELLBUTRIN SR) 100 mg SR tablet Take 1 Tablet by mouth two (2) times a day. 60 Tablet 2    valsartan (DIOVAN) 80 mg tablet Take 1 Tablet by mouth daily. 90 Tablet 1    amLODIPine (NORVASC) 5 mg tablet TAKE 1 TABLET BY MOUTH EVERY DAY 90 Tablet 1    cholecalciferol (VITAMIN D3) (5000 Units/125 mcg) tab tablet Take  by mouth daily. cyanocobalamin 1,000 mcg tablet Take 1,000 mcg by mouth daily. magnesium oxide (MAG-OX) 400 mg tablet Take 400 mg by mouth daily. zinc 50 mg tab tablet Take  by mouth daily. ferrous sulfate 325 mg (65 mg iron) tablet Take  by mouth Daily (before breakfast). lysine 1,000 mg tab Take  by mouth. hydrocortisone (HYTONE) 2.5 % topical cream  (Patient not taking: Reported on 8/26/2022)      Omega-3 Fatty Acids 60- mg cpDR Take  by mouth.  (Patient not taking: Reported on 8/26/2022)

## 2022-09-06 ENCOUNTER — CLINICAL SUPPORT (OUTPATIENT)
Dept: SURGERY | Age: 32
End: 2022-09-06

## 2022-09-06 DIAGNOSIS — E66.01 OBESITY, MORBID, BMI 50 OR HIGHER (HCC): Primary | ICD-10-CM

## 2022-09-06 NOTE — PROGRESS NOTES
[facility]  Metabolic Program Follow-up Nutrition Consult    Date: 2022   Physician: Raimundo Ogden MD  Name: Venessa Shaffer  :  1990    Type of Plan: VLCD, switching to LCD starting this week  Weeks on Plan: 3 months  Virtual visit was completed through [address] [place] [address]. ASSESSMENT:    Medications/Supplements:   Prior to Admission medications    Medication Sig Start Date End Date Taking? Authorizing Provider   norgestimate-ethinyl estradioL (ORTHO-CYCLEN, [facility]) 0.25-35 mg-mcg tab Take one tablet by mouth daily. Skip the placebo pills. On the 12th week, take the placebo pills to allow for menses. 22   Markus Cisneros MD   buPROPion SR [facility] SR) 100 mg SR tablet Take 1 Tablet by mouth two (2) times a day. 22   Ya Bullock MD   valsartan (DIOVAN) 80 mg tablet Take 1 Tablet by mouth daily. 22   Ya Bullock MD   hydrocortisone (HYTONE) 2.5 % topical cream Apply  to affected area as needed. 22   Provider, Historical   amLODIPine (NORVASC) 5 mg tablet TAKE 1 TABLET BY MOUTH EVERY DAY 22   Ya Bullock MD   cholecalciferol (VITAMIN D3) (5000 Units/125 mcg) tab tablet Take 5,000 Units by mouth daily. Provider, Historical   cyanocobalamin 1,000 mcg tablet Take 1,000 mcg by mouth daily. Provider, Historical   magnesium oxide (MAG-OX) 400 mg tablet Take 400 mg by mouth daily. Provider, Historical   Omega-3 Fatty Acids 60- mg cpDR Take  by mouth. Patient not taking: No sig reported    Provider, Historical   zinc 50 mg tab tablet Take 50 mg by mouth daily. Provider, Historical   ferrous sulfate 325 mg (65 mg iron) tablet Take 325 mg by mouth Daily (before breakfast). Provider, Historical   lysine 1,000 mg tab Take 1 Tablet by mouth daily.     Provider, Historical              Starting Weight: 311#   Current Weight: 280# (289# last visit 30 days ago)  Overall Pounds Lost: 31#  Overall Pounds Gained: 0    Exercise/Physical Activity: kickboxing 2x week. Longer walks with dog. Is patient using [facility] products:yes and no  If yes, how many per day: 1-2    Aversions/side effects of product/program: nausea caused by texture and flavor of ND shakes    Fluids used to mix with products:water    Reported Diet History:holiday weekend, had a cookout over the weekend, it was a \"free for all. \"  Felt miserable afterwards, losing desire to eat fried foods and sweets. Doesn't like steak. [place], seafood, chicken and vegetables are all ok. Current ND shakes making her feel sick, trying the savory options to see if this helps. Wellbutrin still causing some nausea but not impacting intake, using Kombucha with eileen to assist.    Ordered protein shakes OTC \"Only What You Need\" which are 230 calories, 12 ounces, 7 g fat, 7 g total carbs (7 g fiber), 35 g protein per shake. Beverages: water with or without crystal light, 64 ounces, also sparkling water    Monitoring calories with NASOFORM pal 1200 calories per day    Barriers/concerns preventing patient from achieving goal(s) since last visit:people noticing the difference in her weight, which makes her feel uncomfortable. Wellbutrin causing nausea. Eating too many sweets causes extreme nausea. NUTRITION INTERVENTION:  Pt educated on nutrition recommendations for the [facility] Low Calorie Plan (LCD), with the specific meal pattern of 2 meal replacements every day plus a grocery meal and snack. Daily recommended totals: 1200 calories, 60 grams carbs, 80+ grams protein, and remaining calories as healthy fats. Use LCD handout for meal and snack suggestions and preparation. Grocery meal:  Use the balanced plate method to plan meals, include 3-6 oz of lean source of protein, unlimited non-starchy vegetables, 1/2 cup whole grains/beans OR 1/2 cup fruit OR 1 serving of low fat dairy. Utilize handouts listing healthy snack and meal ideas. Read all nutrition labels.  Demonstrated and emphasized identifying serving size, total fat, sugar and protein content. Defined low fat as </= 3 g per serving. Discussed lean and extra lean sources of protein. Avoid foods with sugar listed in the first 3 ingredients and >/10 g sugar per serving. Consume meal replacements every three to four hours or pattern as discussed with provider. Mix with water. May add herbs/spices for taste. Practice mindful eating habits; take small bites, chew thoroughly, avoid distractions, utilize hunger/fullness scale. Reviewed attendance policy of attending weekly nutrition classes. Metabolic support group recommended, and increase physical activity (approved per MD) for long term weight maintenance. NUTRITION MONITORING AND EVALUATION: 31# loss x 3 months on VLCD, patient wishes to transition to LCD today. Reviewed LCD guidelines, best tips, and safe use. Followup one month. Specific tips and techniques to facilitate compliance with above recommendations were provided and discussed. If further details are desired please contact me at [phone]. This phone number was also provided to the patient for any further questions or concerns.           Deann Ocampo, MS, RD, LDN

## 2022-09-07 ENCOUNTER — OFFICE VISIT (OUTPATIENT)
Dept: PRIMARY CARE CLINIC | Age: 32
End: 2022-09-07
Payer: MEDICAID

## 2022-09-07 VITALS
HEART RATE: 74 BPM | DIASTOLIC BLOOD PRESSURE: 84 MMHG | TEMPERATURE: 97.3 F | BODY MASS INDEX: 50.18 KG/M2 | SYSTOLIC BLOOD PRESSURE: 132 MMHG | WEIGHT: 283.2 LBS | OXYGEN SATURATION: 99 % | RESPIRATION RATE: 20 BRPM | HEIGHT: 63 IN

## 2022-09-07 DIAGNOSIS — F32.81 PMDD (PREMENSTRUAL DYSPHORIC DISORDER): ICD-10-CM

## 2022-09-07 PROCEDURE — 99213 OFFICE O/P EST LOW 20 MIN: CPT | Performed by: FAMILY MEDICINE

## 2022-09-07 RX ORDER — BUPROPION HYDROCHLORIDE 150 MG/1
150 TABLET ORAL DAILY
Qty: 30 TABLET | Refills: 0 | Status: SHIPPED | OUTPATIENT
Start: 2022-09-07 | End: 2022-09-13

## 2022-09-07 NOTE — PROGRESS NOTES
1. \"Have you been to the ER, urgent care clinic since your last visit? Hospitalized since your last visit? \" No    2. \"Have you seen or consulted any other health care providers outside of the [address] [place] [address] since your last visit? \" No     3. For patients aged 39-70: Has the patient had a colonoscopy / FIT/ Cologuard? NA - based on age      If the patient is female:    4. For patients aged 41-77: Has the patient had a mammogram within the past 2 years? NA - based on age or sex      11. For patients aged 21-65: Has the patient had a pap smear? Yes - Care Gap present.  Most recent result on file  Visit Vitals  BP (!) 165/86 (BP 1 Location: Left upper arm, BP Patient Position: Sitting, BP Cuff Size: Large adult)   Pulse 74   Temp 97.3 °F (36.3 °C) (Temporal)   Resp 20   Ht 5' 3\" (1.6 m)   Wt 283 lb 3.2 oz (128.5 kg)   SpO2 99%   BMI 50.17 kg/m²     Chief Complaint   Patient presents with    Follow-up

## 2022-09-07 NOTE — PROGRESS NOTES
HPI     Chief Complaint   Patient presents with    Follow-up        HPI:  Jeannine Singh is a 27 y.o. female who has a history of PMDD, HTN, Obesity, Anemia. PMDD: Patient was on Zoloft Ativan titrated up to 150 mg a day there was a decrease of 100 mg/day by another provider given concerns it may cause weight gain. She did not notice any improvement in her mood on the Zoloft so we switched her to Wellbutrin. She feels the wellbutrin is helping with her appetite but she notices some agitation. She says she has a lot of life stressors, doesn't wish to discuss today. Is in counseling. Overall feels wellbutrin is helping, does not wish to change. No Known Allergies    Current Outpatient Medications   Medication Sig    buPROPion XL (WELLBUTRIN XL) 150 mg tablet Take 1 Tablet by mouth daily. norgestimate-ethinyl estradioL (ORTHO-CYCLEN, [facility]) 0.25-35 mg-mcg tab Take one tablet by mouth daily. Skip the placebo pills. On the 12th week, take the placebo pills to allow for menses. valsartan (DIOVAN) 80 mg tablet Take 1 Tablet by mouth daily. hydrocortisone (HYTONE) 2.5 % topical cream Apply  to affected area as needed. amLODIPine (NORVASC) 5 mg tablet TAKE 1 TABLET BY MOUTH EVERY DAY    cholecalciferol (VITAMIN D3) (5000 Units/125 mcg) tab tablet Take 5,000 Units by mouth daily. cyanocobalamin 1,000 mcg tablet Take 1,000 mcg by mouth daily. magnesium oxide (MAG-OX) 400 mg tablet Take 400 mg by mouth daily. Omega-3 Fatty Acids 60- mg cpDR Take  by mouth. zinc 50 mg tab tablet Take 50 mg by mouth daily. ferrous sulfate 325 mg (65 mg iron) tablet Take 325 mg by mouth Daily (before breakfast). lysine 1,000 mg tab Take 1 Tablet by mouth daily. No current facility-administered medications for this visit. Review of Systems   Cardiovascular:  Negative for chest pain and palpitations. Psychiatric/Behavioral:  Positive for depression. Negative for suicidal ideas. Reviewed PmHx, FmHx, SocHx as well as meds and allergies, updated and dated in the chart. Objective     Visit Vitals  /84 (BP 1 Location: Right upper arm, BP Patient Position: Sitting)   Pulse 74   Temp 97.3 °F (36.3 °C) (Temporal)   Resp 20   Ht 5' 3\" (1.6 m)   Wt 283 lb 3.2 oz (128.5 kg)   SpO2 99%   BMI 50.17 kg/m²     Physical Exam  Vitals and nursing note reviewed. Constitutional:       Appearance: Normal appearance. Cardiovascular:      Rate and Rhythm: Normal rate. Pulmonary:      Effort: Pulmonary effort is normal. No respiratory distress. Neurological:      Mental Status: She is alert. Psychiatric:         Mood and Affect: Mood normal.         Behavior: Behavior normal.           Assessment and Plan     Diagnoses and all orders for this visit:    1. PMDD (premenstrual dysphoric disorder)  Comments:  Switching to Wellbutrin XL. Starting at 150mg and increse as needed. Patient will contact me through [employer] portal within 2 weeks wih update on symptoms. Other orders  -     buPROPion XL (WELLBUTRIN XL) 150 mg tablet; Take 1 Tablet by mouth daily. As applicable:  Medication Side Effects and Warnings were discussed with patient. Patient Labs were reviewed and or requested. Patient Past Records were reviewed and or requested. Follow-up and Dispositions    Return in about 3 months (around 12/7/2022) for Anxiety/Depression f/u. I have discussed the diagnosis with the patient and the intended plan as seen in the above orders. The patient has received an after-visit summary and questions were answered concerning future plans. I have discussed medication side effects and warnings with the patient as well.       Alyson Gerber MD  [address] [place] [address]

## 2022-09-12 ENCOUNTER — PATIENT MESSAGE (OUTPATIENT)
Dept: PRIMARY CARE CLINIC | Age: 32
End: 2022-09-12

## 2022-09-13 RX ORDER — BUPROPION HYDROCHLORIDE 300 MG/1
300 TABLET ORAL DAILY
Qty: 30 TABLET | Refills: 0 | Status: SHIPPED | OUTPATIENT
Start: 2022-09-13 | End: 2022-10-22 | Stop reason: SDUPTHER

## 2022-09-14 ENCOUNTER — OFFICE VISIT (OUTPATIENT)
Dept: SURGERY | Age: 32
End: 2022-09-14
Payer: MEDICAID

## 2022-09-14 VITALS
OXYGEN SATURATION: 97 % | RESPIRATION RATE: 18 BRPM | HEIGHT: 63 IN | WEIGHT: 279.5 LBS | TEMPERATURE: 98.2 F | SYSTOLIC BLOOD PRESSURE: 131 MMHG | BODY MASS INDEX: 49.52 KG/M2 | HEART RATE: 72 BPM | DIASTOLIC BLOOD PRESSURE: 85 MMHG

## 2022-09-14 DIAGNOSIS — E66.01 OBESITY, MORBID, BMI 50 OR HIGHER (HCC): Primary | ICD-10-CM

## 2022-09-14 DIAGNOSIS — G47.33 OSA (OBSTRUCTIVE SLEEP APNEA): ICD-10-CM

## 2022-09-14 DIAGNOSIS — I10 ESSENTIAL HYPERTENSION: ICD-10-CM

## 2022-09-14 PROCEDURE — 99214 OFFICE O/P EST MOD 30 MIN: CPT | Performed by: FAMILY MEDICINE

## 2022-09-14 NOTE — PROGRESS NOTES
[facility] Progress Note:   F/up Physician Visit    CC: Weight Management      Mehul Garcia is a 27 y.o. female who is here for her  f/up physician visit for the VLCD  Program.  She has lost 4 lbs since the last visit last month  She did a sleep study and was pos. Waiting for the machine  Taking the wellbutrin at 300 mg xl now      Weight Metrics 9/14/2022 9/14/2022 9/7/2022 9/2/2022 8/26/2022 8/19/2022 8/19/2022   Weight - 279 lb 8 oz 283 lb 3.2 oz 280 lb 8 oz 284 lb 4.8 oz 287 lb 289 lb 11.2 oz   Neck Circ (inches) 16 - - - - - -   Waist Measure Inches 51 - - - - - -   Body Fat % 47.5 - - - - - -   BMI - 49.51 kg/m2 50.17 kg/m2 49.69 kg/m2 50.36 kg/m2 50.84 kg/m2 51.32 kg/m2         Outpatient Medications Marked as Taking for the 9/14/22 encounter (Office Visit) with Martine Garcia MD   Medication Sig Dispense Refill    buPROPion XL (WELLBUTRIN XL) 300 mg XL tablet Take 1 Tablet by mouth daily. 30 Tablet 0    norgestimate-ethinyl estradioL (ORTHO-CYCLEN, [facility]) 0.25-35 mg-mcg tab Take one tablet by mouth daily. Skip the placebo pills. On the 12th week, take the placebo pills to allow for menses. 3 Dose Pack 4    valsartan (DIOVAN) 80 mg tablet Take 1 Tablet by mouth daily. 90 Tablet 1    hydrocortisone (HYTONE) 2.5 % topical cream Apply  to affected area as needed. amLODIPine (NORVASC) 5 mg tablet TAKE 1 TABLET BY MOUTH EVERY DAY 90 Tablet 1    cholecalciferol (VITAMIN D3) (5000 Units/125 mcg) tab tablet Take 5,000 Units by mouth daily. cyanocobalamin 1,000 mcg tablet Take 1,000 mcg by mouth daily. magnesium oxide (MAG-OX) 400 mg tablet Take 400 mg by mouth daily. Omega-3 Fatty Acids 60- mg cpDR Take  by mouth. zinc 50 mg tab tablet Take 50 mg by mouth daily. ferrous sulfate 325 mg (65 mg iron) tablet Take 325 mg by mouth Daily (before breakfast). lysine 1,000 mg tab Take 1 Tablet by mouth daily.            Participation   Did you attend clinic and class last week? yes    Review of Systems  Since your last visit, have you experienced any complications? no  If yes, please list:       Are you taking an appetite suppressant? yes  If so, is there any Chest Pain, Palpitations or Dizziness? HUNGER CONTROL: sometimes good    BP Readings from Last 3 Encounters:   09/14/22 131/85   09/07/22 132/84   09/02/22 131/81       SLEEP:tested pos for axel      Have you received any other medical care this week? yes  If yes, where and for what? Sleep study      Have you discontinued or changed any medicine or dose of your medicine since your last visit with Dr Willian Weaver? no  If yes, where and for what? Diet  How many ounces of calorie-free liquids did you consume each day? 50-80 oz    How many meal replacements did you take each day? None in a couple weeks  She is drinking a vegan type drink, was approved by our dietitian      Did you have any problems adhering to the program?  yes If yes, please explain:      Exercise  Aerobic exercise: taking longer walks with the dog and goes to gym once or twice a week  min  Resistance exercise: 0 workouts / week  Any discomfort?  no     If yes, where? Objective  Visit Vitals  /85 (BP 1 Location: Right arm, BP Patient Position: Sitting, BP Cuff Size: Large adult)   Pulse 72   Temp 98.2 °F (36.8 °C) (Oral)   Resp 18   Ht 5' 3\" (1.6 m)   Wt 279 lb 8 oz (126.8 kg)   LMP  (LMP Unknown) Comment: on BCP   SpO2 97%   BMI 49.51 kg/m²     No LMP recorded (lmp unknown). Physical Exam  Appearance: well,   Mental:A&O x 3, NAD  H:NC/AT,  EENT:   EOMI, PERRL, No scleral icterus  Neck: no bruit or JVD  Lung: clear, No W/R  ABD: soft, active, nontender  Ext:  no Edema  Neuro: nonfocal  Assessment / Plan    Encounter Diagnoses   Name Primary? Obesity, morbid, BMI 50 or higher ([facility] [address] Yes    Essential hypertension     AXEL (obstructive sleep apnea)      Diagnoses and all orders for this visit:    1.  Obesity, morbid, BMI 50 or higher ([facility] [address]  Down 4 lbs since a week ago but prior to that was losing and regaining the same 3-4  lbs. Started at 311 lb in june  Going up to 300 mg wellbutrin xl officially  for better more constant weight loss  2. Essential hypertension  Cont valsartan 80 mg  Amlodipine 5 mg    3. ISABEL (obstructive sleep apnea)  Waiting for the machine    1. Weight management improved   Progress was reviewed with patient    2. Labs    Latest results reviewed with patient       face to face time with Eliz Noguera consisted of counseling & coordinating and/or discussing treatment plans in reference to her obesity The primary encounter diagnosis was Obesity, morbid, BMI 50 or higher ([facility] [address] Diagnoses of Essential hypertension and ISABEL (obstructive sleep apnea) were also pertinent to this visit.

## 2022-09-14 NOTE — PROGRESS NOTES
Weight Management. 1 month follow up. 1. Have you been to the ER, urgent care clinic since your last visit? Hospitalized since your last visit? No    2. Have you seen or consulted any other health care providers outside of the [address] [place] [address] since your last visit? Include any pap smears or colon screening. No    BMI - 49.1    Patient attended triage but did not bring homework form. Patient instructed to email or fax completed homework form to us. Patient informed that not bringing the homework form can result in not being seen next time.

## 2022-09-15 LAB
ALBUMIN SERPL-MCNC: 4.4 G/DL (ref 3.8–4.8)
ALBUMIN/GLOB SERPL: 1.7 {RATIO} (ref 1.2–2.2)
ALP SERPL-CCNC: 60 IU/L (ref 44–121)
ALT SERPL-CCNC: 15 IU/L (ref 0–32)
AST SERPL-CCNC: 15 IU/L (ref 0–40)
BILIRUB SERPL-MCNC: 0.2 MG/DL (ref 0–1.2)
BUN SERPL-MCNC: 11 MG/DL (ref 6–20)
BUN/CREAT SERPL: 14 (ref 9–23)
CALCIUM SERPL-MCNC: 9.2 MG/DL (ref 8.7–10.2)
CHLORIDE SERPL-SCNC: 103 MMOL/L (ref 96–106)
CO2 SERPL-SCNC: 22 MMOL/L (ref 20–29)
CREAT SERPL-MCNC: 0.76 MG/DL (ref 0.57–1)
EGFR: 105 ML/MIN/1.73
GLOBULIN SER CALC-MCNC: 2.6 G/DL (ref 1.5–4.5)
GLUCOSE SERPL-MCNC: 77 MG/DL (ref 65–99)
POTASSIUM SERPL-SCNC: 4.1 MMOL/L (ref 3.5–5.2)
PROT SERPL-MCNC: 7 G/DL (ref 6–8.5)
SODIUM SERPL-SCNC: 140 MMOL/L (ref 134–144)

## 2022-09-15 NOTE — TELEPHONE ENCOUNTER
----- Message from René Lisa MD sent at 9/13/2022  9:26 AM EDT -----  Can you please call and check on her Wednesday/Thursday to see how she is doing on new medication as she has endorsed depression and thoughts of self-harm and I will be out of the office. I made medication changes and gave her ER precautions.

## 2022-09-15 NOTE — TELEPHONE ENCOUNTER
Called and spoke with patient. Continuing with medication. No thoughts of self-harm . Doing better today. Advised if symptoms worsen to go to ER for eval/treat. Will update office as needed.

## 2022-09-21 ENCOUNTER — CLINICAL SUPPORT (OUTPATIENT)
Dept: SURGERY | Age: 32
End: 2022-09-21

## 2022-09-21 VITALS
WEIGHT: 280 LBS | TEMPERATURE: 98.1 F | HEART RATE: 69 BPM | HEIGHT: 63 IN | BODY MASS INDEX: 49.61 KG/M2 | SYSTOLIC BLOOD PRESSURE: 141 MMHG | DIASTOLIC BLOOD PRESSURE: 95 MMHG | RESPIRATION RATE: 18 BRPM | OXYGEN SATURATION: 97 %

## 2022-09-21 DIAGNOSIS — G47.33 OSA (OBSTRUCTIVE SLEEP APNEA): ICD-10-CM

## 2022-09-21 DIAGNOSIS — I10 ESSENTIAL HYPERTENSION: ICD-10-CM

## 2022-09-21 DIAGNOSIS — E66.01 CLASS 3 OBESITY (HCC): Primary | ICD-10-CM

## 2022-09-21 NOTE — PROGRESS NOTES
9/4/2022    Progress Note: Weekly Education Class in the [facility]         Patient is on Very Low Calorie Diet [x] (4 meal replacements per day, 800 kcal/day)      Low Calorie Diet [] (2-3 meal replacements per day, 1770-6462 kcal/day)    1) Did patient have any new symptoms or physical problems? Yes []    No [x]    If yes, check & comment: weakness [], fatigue [], lightheadedness [], headache [], cramps [], cold intolerance [], hair loss [], diarrhea [], constipation [],  NA [] other:                                 2) Has patient had any medical attention from other providers, urgent care or the emergency room this week? Yes []  No [x]       NA [], If yes, why:                                       3) Any other sugar sweetened beverages consumed this week? Yes [x]  No []    4) Did patient have any problems adhering to the diet? Yes [x]  No [] NA []    If yes, Vacation [], Celebrations [], Conferences [], Family Reunions [] other:                                                5) How many hours of sleep this week? 7-9    (range)  NA []    Number of meal replacements consumed daily? 1  (range)  NA []    Average ounces of water patient consumed daily this week (not including shakes)? 61     (divide the weekly total by 7)    Did you eat any food outside of the program? Yes [] No [x]    Physical Activity Over the Past Week:    Cardio exercise: 0 min  Strength exercise: 0 workouts / week  Number of steps walked per day: 0478-3055    How has patient mood overall been this week? Sad [], Happy [], Stressed [], Tired [], Content [], NA [], other NOT ANSWERED             Medications reconciled by nurse Yes [x]  No[]    Patient was given therapeutic recommendations for any noted side effects of their dietary approach based upon [facility] patient manual per providers recommendation.      9/11/2022    Progress Note: Weekly Education Class in the [facility]         Patient is on Very Low Calorie Diet [x] (4 meal replacements per day, 800 kcal/day)      Low Calorie Diet [] (2-3 meal replacements per day, 8589-2299 kcal/day)    1) Did patient have any new symptoms or physical problems? Yes []    No [x]    If yes, check & comment: weakness [], fatigue [], lightheadedness [], headache [], cramps [], cold intolerance [], hair loss [], diarrhea [], constipation [],  NA [] other:                                 2) Has patient had any medical attention from other providers, urgent care or the emergency room this week? Yes []  No [x]       NA [], If yes, why:                                       3) Any other sugar sweetened beverages consumed this week? Yes [x]  No []    4) Did patient have any problems adhering to the diet? Yes []  No [] NA []    If yes, Vacation [], Celebrations [], Conferences [], Family Reunions [] other: NOT ANSWERED                                                 5) How many hours of sleep this week? 4.5-8    (range)  NA []    Number of meal replacements consumed daily? 0  (range)  NA []    Average ounces of water patient consumed daily this week (not including shakes)? 68     (divide the weekly total by 7)    Did you eat any food outside of the program? Yes [] No [x]    Physical Activity Over the Past Week:    Cardio exercise: 0 min  Strength exercise: 0 workouts / week  Number of steps walked per day: 5729-11,227    How has patient mood overall been this week? Sad [], Happy [], Stressed [], Tired [], Content [], NA [], other NOT ANSWERED             Medications reconciled by nurse Yes []  No[]    Patient was given therapeutic recommendations for any noted side effects of their dietary approach based upon [facility] patient manual per providers recommendation.

## 2022-09-21 NOTE — PROGRESS NOTES
Weight Management. 1. Have you been to the ER, urgent care clinic since your last visit? Hospitalized since your last visit? No    2. Have you seen or consulted any other health care providers outside of the [address] [place] [address] since your last visit? Include any pap smears or colon screening.  No

## 2022-10-03 NOTE — PROGRESS NOTES
Nurse note from patient's weekly VLCD class was reviewed. Pertinent medical concerns were:   reviewed     BP Readings from Last 3 Encounters:   09/21/22 (!) 141/95   09/14/22 131/85   09/07/22 132/84       Weight Metrics 9/21/2022 9/14/2022 9/14/2022 9/7/2022 9/2/2022 8/26/2022 8/19/2022   Weight 280 lb - 279 lb 8 oz 283 lb 3.2 oz 280 lb 8 oz 284 lb 4.8 oz 287 lb   Neck Circ (inches) - 16 - - - - -   Waist Measure Inches - 51 - - - - -   Body Fat % - 47.5 - - - - -   BMI 49.6 kg/m2 - 49.51 kg/m2 50.17 kg/m2 49.69 kg/m2 50.36 kg/m2 50.84 kg/m2       Current Outpatient Medications   Medication Sig Dispense Refill    buPROPion XL (WELLBUTRIN XL) 300 mg XL tablet Take 1 Tablet by mouth daily. 30 Tablet 0    norgestimate-ethinyl estradioL (ORTHO-CYCLEN, [facility]) 0.25-35 mg-mcg tab Take one tablet by mouth daily. Skip the placebo pills. On the 12th week, take the placebo pills to allow for menses. 3 Dose Pack 4    valsartan (DIOVAN) 80 mg tablet Take 1 Tablet by mouth daily. 90 Tablet 1    hydrocortisone (HYTONE) 2.5 % topical cream Apply  to affected area as needed. amLODIPine (NORVASC) 5 mg tablet TAKE 1 TABLET BY MOUTH EVERY DAY 90 Tablet 1    cholecalciferol (VITAMIN D3) (5000 Units/125 mcg) tab tablet Take 5,000 Units by mouth daily. cyanocobalamin 1,000 mcg tablet Take 1,000 mcg by mouth daily. magnesium oxide (MAG-OX) 400 mg tablet Take 400 mg by mouth daily. Omega-3 Fatty Acids 60- mg cpDR Take  by mouth. zinc 50 mg tab tablet Take 50 mg by mouth daily. ferrous sulfate 325 mg (65 mg iron) tablet Take 325 mg by mouth Daily (before breakfast). lysine 1,000 mg tab Take 1 Tablet by mouth daily.

## 2022-10-03 NOTE — PROGRESS NOTES
Nurse note from patient's weekly LCD / Maintenance class was reviewed. Pertinent medical concerns were:   reviewed     BP Readings from Last 3 Encounters:   09/21/22 (!) 141/95   09/14/22 131/85   09/07/22 132/84       Weight Metrics 9/21/2022 9/14/2022 9/14/2022 9/7/2022 9/2/2022 8/26/2022 8/19/2022   Weight 280 lb - 279 lb 8 oz 283 lb 3.2 oz 280 lb 8 oz 284 lb 4.8 oz 287 lb   Neck Circ (inches) - 16 - - - - -   Waist Measure Inches - 51 - - - - -   Body Fat % - 47.5 - - - - -   BMI 49.6 kg/m2 - 49.51 kg/m2 50.17 kg/m2 49.69 kg/m2 50.36 kg/m2 50.84 kg/m2       Current Outpatient Medications   Medication Sig Dispense Refill    norgestimate-ethinyl estradioL (ORTHO-CYCLEN, SPRINTEC) 0.25-35 mg-mcg tab Take one tablet by mouth daily. Skip the placebo pills. On the 12th week, take the placebo pills to allow for menses. 3 Dose Pack 4    valsartan (DIOVAN) 80 mg tablet Take 1 Tablet by mouth daily. 90 Tablet 1    hydrocortisone (HYTONE) 2.5 % topical cream Apply  to affected area as needed. amLODIPine (NORVASC) 5 mg tablet TAKE 1 TABLET BY MOUTH EVERY DAY 90 Tablet 1    cholecalciferol (VITAMIN D3) (5000 Units/125 mcg) tab tablet Take 5,000 Units by mouth daily. cyanocobalamin 1,000 mcg tablet Take 1,000 mcg by mouth daily. magnesium oxide (MAG-OX) 400 mg tablet Take 400 mg by mouth daily. zinc 50 mg tab tablet Take 50 mg by mouth daily. ferrous sulfate 325 mg (65 mg iron) tablet Take 325 mg by mouth Daily (before breakfast). lysine 1,000 mg tab Take 1 Tablet by mouth daily. buPROPion XL (WELLBUTRIN XL) 300 mg XL tablet Take 1 Tablet by mouth daily. 30 Tablet 0    Omega-3 Fatty Acids 60- mg cpDR Take  by mouth.

## 2022-10-25 RX ORDER — BUPROPION HYDROCHLORIDE 300 MG/1
300 TABLET ORAL DAILY
Qty: 30 TABLET | Refills: 0 | Status: SHIPPED | OUTPATIENT
Start: 2022-10-25

## 2022-12-07 ENCOUNTER — OFFICE VISIT (OUTPATIENT)
Dept: PRIMARY CARE CLINIC | Age: 32
End: 2022-12-07
Payer: MEDICAID

## 2022-12-07 VITALS
HEART RATE: 74 BPM | BODY MASS INDEX: 50.46 KG/M2 | RESPIRATION RATE: 20 BRPM | HEIGHT: 63 IN | SYSTOLIC BLOOD PRESSURE: 138 MMHG | WEIGHT: 284.8 LBS | OXYGEN SATURATION: 98 % | DIASTOLIC BLOOD PRESSURE: 87 MMHG | TEMPERATURE: 97.2 F

## 2022-12-07 DIAGNOSIS — F32.81 PMDD (PREMENSTRUAL DYSPHORIC DISORDER): Primary | ICD-10-CM

## 2022-12-07 PROCEDURE — 3074F SYST BP LT 130 MM HG: CPT | Performed by: FAMILY MEDICINE

## 2022-12-07 PROCEDURE — 99213 OFFICE O/P EST LOW 20 MIN: CPT | Performed by: FAMILY MEDICINE

## 2022-12-07 PROCEDURE — 3078F DIAST BP <80 MM HG: CPT | Performed by: FAMILY MEDICINE

## 2022-12-07 RX ORDER — BUPROPION HYDROCHLORIDE 100 MG/1
TABLET ORAL
Qty: 90 TABLET | Refills: 2 | Status: SHIPPED | OUTPATIENT
Start: 2022-12-07

## 2022-12-07 RX ORDER — BUPROPION HYDROCHLORIDE 300 MG/1
300 TABLET ORAL DAILY
Qty: 30 TABLET | Refills: 0 | Status: CANCELLED | OUTPATIENT
Start: 2022-12-07

## 2022-12-07 NOTE — PROGRESS NOTES
HPI     Chief Complaint   Patient presents with    Anxiety     Anxiety/depression        HPI:  Franck Muller is a 27 y.o. female who has a history of PMDD, HTN, Obesity, Anemia. PMDD: Patient initially was on zoloft but did not notice improvement in mood so we switched her to wellbutrin 100mg BID which we then switched to wellbutrin 150mg XL because she had some increased agitation and we wanted to see if XL helped better. Due to persistent anxiety we then increased dose to 300mg XL daily. She feels anxiety is better but feels she did better on bupropion SR vs the XL. She is currently taking the SR again because she ran out of the XL. She is still in counseling. She denies HI/SI. No Known Allergies    Current Outpatient Medications   Medication Sig    buPROPion (WELLBUTRIN) 100 mg tablet Take 2 tablets po qam and one tablet po every evening. norgestimate-ethinyl estradioL (ORTHO-CYCLEN, SPRINTEC) 0.25-35 mg-mcg tab Take one tablet by mouth daily. Skip the placebo pills. On the 12th week, take the placebo pills to allow for menses. valsartan (DIOVAN) 80 mg tablet Take 1 Tablet by mouth daily. hydrocortisone (HYTONE) 2.5 % topical cream Apply  to affected area as needed. amLODIPine (NORVASC) 5 mg tablet TAKE 1 TABLET BY MOUTH EVERY DAY    cholecalciferol (VITAMIN D3) (5000 Units/125 mcg) tab tablet Take 5,000 Units by mouth daily. cyanocobalamin 1,000 mcg tablet Take 1,000 mcg by mouth daily. magnesium oxide (MAG-OX) 400 mg tablet Take 400 mg by mouth daily. Omega-3 Fatty Acids 60- mg cpDR Take  by mouth. zinc 50 mg tab tablet Take 50 mg by mouth daily. ferrous sulfate 325 mg (65 mg iron) tablet Take 325 mg by mouth Daily (before breakfast). lysine 1,000 mg tab Take 1 Tablet by mouth daily. No current facility-administered medications for this visit. Review of Systems   Psychiatric/Behavioral:  Positive for depression. Negative for suicidal ideas. Reviewed PmHx, FmHx, SocHx as well as meds and allergies, updated and dated in the chart. Objective     Visit Vitals  /87 (BP 1 Location: Left upper arm, BP Patient Position: Sitting, BP Cuff Size: Large adult)   Pulse 74   Temp 97.2 °F (36.2 °C) (Temporal)   Resp 20   Ht 5' 3\" (1.6 m)   Wt 284 lb 12.8 oz (129.2 kg)   SpO2 98%   BMI 50.45 kg/m²     Physical Exam  Vitals and nursing note reviewed. Constitutional:       Appearance: Normal appearance. Cardiovascular:      Rate and Rhythm: Normal rate. Pulmonary:      Effort: Pulmonary effort is normal. No respiratory distress. Neurological:      Mental Status: She is alert. Psychiatric:         Mood and Affect: Mood normal.         Behavior: Behavior normal.      Comments: Pleasant demeanor and good eye contact. Assessment and Plan     Will switching back to bupropion SR. Continue counseling. Precautions given. Follow up INI. Diagnoses and all orders for this visit:    1. PMDD (premenstrual dysphoric disorder)  -     buPROPion (WELLBUTRIN) 100 mg tablet; Take 2 tablets po qam and one tablet po every evening. As applicable:  Medication Side Effects and Warnings were discussed with patient. Patient Labs were reviewed and or requested. Patient Past Records were reviewed and or requested. Follow-up and Dispositions    Return in about 3 months (around 3/7/2023) for Mood follow up. I have discussed the diagnosis with the patient and the intended plan as seen in the above orders. The patient has received an after-visit summary and questions were answered concerning future plans. I have discussed medication side effects and warnings with the patient as well.       Sunday Redmond MD  [address] [place] [address]

## 2022-12-07 NOTE — PROGRESS NOTES
1. \"Have you been to the ER, urgent care clinic since your last visit? Hospitalized since your last visit? \" No    2. \"Have you seen or consulted any other health care providers outside of the [address] [place] [address] since your last visit? \"  Yes      3. For patients aged 39-70: Has the patient had a colonoscopy / FIT/ Cologuard? NA - based on age      If the patient is female:    4. For patients aged 41-77: Has the patient had a mammogram within the past 2 years? NA - based on age or sex      11. For patients aged 21-65: Has the patient had a pap smear? Yes - Care Gap present.  Most recent result on file  Visit Vitals  /87 (BP 1 Location: Left upper arm, BP Patient Position: Sitting, BP Cuff Size: Large adult)   Pulse 74   Temp 97.2 °F (36.2 °C) (Temporal)   Resp 20   Ht 5' 3\" (1.6 m)   Wt 284 lb 12.8 oz (129.2 kg)   SpO2 98%   BMI 50.45 kg/m²     Chief Complaint   Patient presents with    Anxiety     Anxiety/depression

## 2022-12-27 RX ORDER — AMLODIPINE BESYLATE 5 MG/1
TABLET ORAL
Qty: 90 TABLET | Refills: 1 | Status: SHIPPED | OUTPATIENT
Start: 2022-12-27

## 2022-12-27 RX ORDER — VALSARTAN 80 MG/1
TABLET ORAL
Qty: 90 TABLET | Refills: 1 | Status: SHIPPED | OUTPATIENT
Start: 2022-12-27

## 2023-01-04 ENCOUNTER — PATIENT MESSAGE (OUTPATIENT)
Dept: SLEEP MEDICINE | Age: 33
End: 2023-01-04

## 2023-01-06 NOTE — PROGRESS NOTES
[address] [place], [address]   Tel.  [phone]   Fax. [address] [facility] [address] [facility] [address]   [place], [address] [facility]   Tel.  [phone]   Fax. [phone] [address]    Tel.  [phone]   Fax. [phone]     Mary Kern (: 1990) is a 27 y.o. female, established patient, seen for positive airway pressure follow-up and evaluation. She was last seen by Dr. Jessie Zelaya on 2022, prior notes reviewed in detail. Home sleep test  showed AHI of 13.6/hr with a lowest SaO2 of 80%, duration of SaO2 < 88% 2.4 min. She is seen today for follow up. ASSESSMENT/PLAN:    ICD-10-CM ICD-9-CM    1. ISABEL (obstructive sleep apnea)  G47.33 327.23 AMB SUPPLY ORDER      2. Primary hypertension  I10 401.9       3. BMI 45.0-49.9, adult (HCC)  Z68.42 V85.42         AHI = 13.6 (). On APAP :  6-15 cmH2O. Set up . She is adherent with PAP therapy and PAP continues to benefit patient and remains necessary for control of her sleep apnea. Sleep Apnea - continue on current pressures. Orders Placed This Encounter    AMB SUPPLY ORDER     Diagnosis: (G47.33) ISABEL (obstructive sleep apnea)  (primary encounter diagnosis)     Replacement Supplies for Positive Airway Pressure Therapy Device:   Duration of need: 99 months.  Nasal Pillows Combo Mask (Replace) 2 per month.  Nasal Pillows (Replace) 2 per month.  Nasal Cushion (Replace) 2 per month.  Nasal Interface Mask 1 every 3 months.  Headgear 1 every 6 months.  Positive Airway Pressure chinstrap 1 every 6 months.  Tubing with heating element 1 every 3 months.  Filter(s) Disposable 2 per month.  Filter(s) Non-Disposable 1 every 6 months. .   [address] [facility] for Humidifier (Replace) 1 every 6 months. AIMEE Roque-BC NPI: [id number]    Electronically signed.  Date:- 23     * Counseling was provided regarding the importance of regular PAP use with emphasis on ensuring sufficient total sleep time, proper sleep hygiene, and safe driving. * Re-enforced proper and regular cleaning for the device. * She was asked to contact our office for any problems regarding PAP therapy. 2. Hypertension -  continue on her current regimen, she will continue to monitor her BP and follow up with her PMD for reevaluation/adjustment of medications if warranted. I have reviewed the relationship between hypertension as it relates to sleep-disordered breathing. 3. Recommended a dedicated weight loss program through appropriate diet and exercise regimen as significant weight reduction has been shown to reduce severity of obstructive sleep apnea. SUBJECTIVE/OBJECTIVE:    She  is seen today for follow up on PAP device and reports no problems using the device. The following concerns identified:    Drowsiness no Problems exhaling no   Snoring no Forget to put on no   Mask Comfortable yes Can't fall asleep no   Dry Mouth no Mask falls off no   Air Leaking no Frequent awakenings no     She admits that her sleep has significantly improved on PAP therapy using nasal mask and heated tubing. Review of device download indicated:  Auto pressure: 6-15 cmH2O; Mean Pressure: 7.0 cmH2O;  95th percentile Pressure: 7.9 cmH2O   % Used Days >= 4 hours: 85. Avg hours used:  6 hours 15 minutes. Therapy Apnea Index averaged over PAP use: 0.2 /hr which reflects significantly improved sleep breathing condition. Blackwell Sleepiness Score: (P) 5 and Modified F.O.S.Q. Score Total / 2: (P) 13.5 which reflects improved sleep quality over therapy time. Sleep Review of Systems: notable for Negative difficulty falling asleep; Negative awakenings at night; Negative early morning headaches; Negative memory problems; Negative concentration issues;  Negative chest pain; Negative shortness of breath; Negative significant joint pain at night; Negative significant muscle pain at night; Negative rashes or itching; Negative heartburn; Negative significant mood issues    Visit Vitals  BP (!) 140/93 (BP 1 Location: Left upper arm, BP Patient Position: Sitting, BP Cuff Size: Large adult)   Pulse 79   Temp 97.5 °F (36.4 °C) (Temporal)   Ht 5' 3\" (1.6 m)   Wt 278 lb 11.2 oz (126.4 kg)   SpO2 99%   BMI 49.37 kg/m²        General:   Alert, oriented, not in acute distress   Eyes:  Anicteric Sclerae; no obvious strabismus   Nose:  No obvious nasal septum deviation    Neck:   Midline trachea   Chest/Lungs:  Symmetrical lung expansion, clear lung fields on auscultation    CVS:  Normal rate, regular rhythm,  no JVD   Extremities:  No obvious rashes, no edema    Neuro:  No focal deficits; No obvious tremor    Psych:  Normal affect,  normal countenance     Patient's phone number [phone] (home)  was reviewed and confirmed for accuracy. She gives permission for messages regarding results and appointments to be left at that number. On this date 01/09/2023 I have spent 20 minutes reviewing previous notes, test results and face to face with the patient discussing the diagnosis and importance of compliance with the treatment plan as well as documenting on the day of the visit. An electronic signature was used to authenticate this note.     -- Jasen Cuenca NP, [facility]  01/09/23

## 2023-01-09 ENCOUNTER — DOCUMENTATION ONLY (OUTPATIENT)
Dept: SLEEP MEDICINE | Age: 33
End: 2023-01-09

## 2023-01-09 ENCOUNTER — OFFICE VISIT (OUTPATIENT)
Dept: SLEEP MEDICINE | Age: 33
End: 2023-01-09
Payer: MEDICAID

## 2023-01-09 VITALS
HEIGHT: 63 IN | TEMPERATURE: 97.5 F | BODY MASS INDEX: 49.38 KG/M2 | SYSTOLIC BLOOD PRESSURE: 140 MMHG | DIASTOLIC BLOOD PRESSURE: 93 MMHG | OXYGEN SATURATION: 99 % | WEIGHT: 278.7 LBS | HEART RATE: 79 BPM

## 2023-01-09 DIAGNOSIS — I10 PRIMARY HYPERTENSION: ICD-10-CM

## 2023-01-09 DIAGNOSIS — G47.33 OSA (OBSTRUCTIVE SLEEP APNEA): Primary | ICD-10-CM

## 2023-01-09 PROCEDURE — 3077F SYST BP >= 140 MM HG: CPT | Performed by: NURSE PRACTITIONER

## 2023-01-09 PROCEDURE — 3080F DIAST BP >= 90 MM HG: CPT | Performed by: NURSE PRACTITIONER

## 2023-01-09 PROCEDURE — 99213 OFFICE O/P EST LOW 20 MIN: CPT | Performed by: NURSE PRACTITIONER

## 2023-01-09 NOTE — PATIENT INSTRUCTIONS
[address] [facility] [place] [place], [address]  Tel.  [phone]  Fax. [address] [facility] [address]  [place], [address] [facility]  Tel.  [phone]  Fax. [phone] [address]  Tel.  [phone]  Fax. [phone]     Learning About CPAP for Sleep Apnea  What is CPAP? CPAP is a small machine that you use at home every night while you sleep. It increases air pressure in your throat to keep your airway open. When you have sleep apnea, this can help you sleep better so you feel much better. CPAP stands for \"continuous positive airway pressure. \"  The CPAP machine will have one of the following:  A mask that covers your nose and mouth  Prongs that fit into your nose  A mask that covers your nose only, the most common type. This type is called NCPAP. The N stands for \"nasal.\"  Why is it done? CPAP is usually the best treatment for obstructive sleep apnea. It is the first treatment choice and the most widely used. Your doctor may suggest CPAP if you have: Moderate to severe sleep apnea. Sleep apnea and coronary artery disease (CAD) or heart failure. How does it help? CPAP can help you have more normal sleep, so you feel less sleepy and more alert during the daytime. CPAP may help keep heart failure or other heart problems from getting worse. NCPAP may help lower your blood pressure. If you use CPAP, your bed partner may also sleep better because you are not snoring or restless. What are the side effects? Some people who use CPAP have:  A dry or stuffy nose and a sore throat. Irritated skin on the face. Sore eyes. Bloating. If you have any of these problems, work with your doctor to fix them. Here are some things you can try:  Be sure the mask or nasal prongs fit well. See if your doctor can adjust the pressure of your CPAP. If your nose is dry, try a humidifier.   If your nose is runny or stuffy, try decongestant medicine or a steroid nasal spray. If these things do not help, you might try a different type of machine. Some machines have air pressure that adjusts on its own. Others have air pressures that are different when you breathe in than when you breathe out. This may reduce discomfort caused by too much pressure in your nose. Where can you learn more? Go to Rewarder.be  Enter William Beck in the search box to learn more about \"Learning About CPAP for Sleep Apnea. \"   © 1338-2821 Healthwise, Incorporated. Care instructions adapted under license by [facility] (which disclaims liability or warranty for this information). This care instruction is for use with your licensed healthcare professional. If you have questions about a medical condition or this instruction, always ask your healthcare professional. [address] any warranty or liability for your use of this information. Content Version: 4.6.90736; Last Revised: January 11, 2010  PROPER SLEEP HYGIENE    What to avoid  Do not have drinks with caffeine, such as coffee or black tea, for 8 hours before bed. Do not smoke or use other types of tobacco near bedtime. Nicotine is a stimulant and can keep you awake. Avoid drinking alcohol late in the evening, because it can cause you to wake in the middle of the night. Do not eat a big meal close to bedtime. If you are hungry, eat a light snack. Do not drink a lot of water close to bedtime, because the need to urinate may wake you up during the night. Do not read or watch TV in bed. Use the bed only for sleeping and sexual activity. What to try  Go to bed at the same time every night, and wake up at the same time every morning. Do not take naps during the day. Keep your bedroom quiet, dark, and cool. Get regular exercise, but not within 3 to 4 hours of your bedtime. .  Sleep on a comfortable pillow and mattress.   If watching the clock makes you anxious, turn it facing away from you so you cannot see the time. If you worry when you lie down, start a worry book. Well before bedtime, write down your worries, and then set the book and your concerns aside. Try meditation or other relaxation techniques before you go to bed. If you cannot fall asleep, get up and go to another room until you feel sleepy. Do something relaxing. Repeat your bedtime routine before you go to bed again. Make your house quiet and calm about an hour before bedtime. Turn down the lights, turn off the TV, log off the computer, and turn down the volume on music. This can help you relax after a busy day. Drowsy Driving: The [place] [address] cites drowsiness as a causing factor in more than 994,062 police reported crashes annually, resulting in 76,000 injuries and 1,500 deaths. Other surveys suggest 55% of people polled have driven while drowsy in the past year, 23% had fallen asleep but not crashed, 3% crashed, and 2% had and accident due to drowsy driving. Who is at risk? Young Drivers: One study of drowsy driving accidents states that 55% of the drivers were under 25 years. Of those, 75% were male. Shift Workers and Travelers: People who work overnight or travel across time zones frequently are at higher risk of experiencing Circadian Rhythm Disorders. They are trying to work and function when their body is programed to sleep. Sleep Deprived: Lack of sleep has a serious impact on your ability to pay attention or focus on a task. Consistently getting less than the average of 8 hours your body needs creates partial or cumulative sleep deprivation. Untreated Sleep Disorders: Sleep Apnea, Narcolepsy, R.L.S., and other sleep disorders (untreated) prevent a person from getting enough restful sleep. This leads to excessive daytime sleepiness and increases the risk for drowsy driving accidents by up to 7 times.   Medications / Alcohol: Even over the counter medications can cause drowsiness. Medications that impair a drivers attention should have a warning label. Alcohol naturally makes you sleepy and on its own can cause accidents. Combined with excessive drowsiness its effects are amplified. Signs of Drowsy Driving:   * You don't remember driving the last few miles   * You may drift out of your janice   * You are unable to focus and your thoughts wander   * You may yawn more often than normal   * You have difficulty keeping your eyes open / nodding off   * Missing traffic signs, speeding, or tailgating  Prevention-   Good sleep hygiene, lifestyle and behavioral choices have the most impact on drowsy driving. There is no substitute for sleep and the average person requires 8 hours nightly. If you find yourself driving drowsy, stop and sleep. Consider the sleep hygiene tips provided during your visit as well. Medication Refill Policy: Refills for all medications require 1 week advance notice. Please have your pharmacy fax a refill request. We are unable to fax, or call in \"controled substance\" medications and you will need to pick these prescriptions up from our office. Over[employer] Studio Activation    Thank you for requesting access to Over[employer] Studio. Please follow the instructions below to securely access and download your online medical record. Overlay Studio allows you to send messages to your doctor, view your test results, renew your prescriptions, schedule appointments, and more. How Do I Sign Up? In your internet browser, go to [email or web address] Jumo[employer][email or web address]. Click on the First Time User? Click Here link in the Sign In box. You will see the New Member Sign Up page. Enter your Overlay Studio Access Code exactly as it appears below. You will not need to use this code after youve completed the sign-up process. If you do not sign up before the expiration date, you must request a new code. Overlay Studio Access Code:  Activation code not generated  Current Overlay Studio Status: Active (This is the date your Ceros access code will )    Enter the last four digits of your Social Security Number (xxxx) and Date of Birth (mm/dd/yyyy) as indicated and click Submit. You will be taken to the next sign-up page. Create a Ceros ID. This will be your Ceros login ID and cannot be changed, so think of one that is secure and easy to remember. Create a Ceros password. You can change your password at any time. Enter your Password Reset Question and Answer. This can be used at a later time if you forget your password. Enter your e-mail address. You will receive e-mail notification when new information is available in [address] Click Sign Up. You can now view and download portions of your medical record. Click the DataProm link to download a portable copy of your medical information. Additional Information    If you have questions, please call [phone]. Remember, Ceros is NOT to be used for urgent needs. For medical emergencies, dial 911.

## 2023-03-08 ENCOUNTER — VIRTUAL VISIT (OUTPATIENT)
Dept: PRIMARY CARE CLINIC | Age: 33
End: 2023-03-08
Payer: MEDICAID

## 2023-03-08 DIAGNOSIS — F32.81 PMDD (PREMENSTRUAL DYSPHORIC DISORDER): Primary | ICD-10-CM

## 2023-03-08 DIAGNOSIS — F90.2 ATTENTION DEFICIT HYPERACTIVITY DISORDER (ADHD), COMBINED TYPE: ICD-10-CM

## 2023-03-08 PROCEDURE — 99214 OFFICE O/P EST MOD 30 MIN: CPT | Performed by: FAMILY MEDICINE

## 2023-03-08 RX ORDER — BUPROPION HYDROCHLORIDE 100 MG/1
TABLET ORAL
Qty: 90 TABLET | Refills: 2 | Status: SHIPPED | OUTPATIENT
Start: 2023-03-08

## 2023-03-08 RX ORDER — ATOMOXETINE 40 MG/1
40 CAPSULE ORAL DAILY
Qty: 30 CAPSULE | Refills: 0 | Status: SHIPPED | OUTPATIENT
Start: 2023-03-08

## 2023-03-08 NOTE — PROGRESS NOTES
Jimmy Camarillo (: 1990) is a 27 y.o. female, established patient, here for evaluation of the following chief complaint(s): Anxiety       ASSESSMENT/PLAN:  Below is the assessment and plan developed based on review of pertinent history, labs, studies, and medications. 1. PMDD (premenstrual dysphoric disorder)  Comments:  Stable. Continue wellbutrin SR 200mg QAM and 100mg QHS. Orders:  -     buPROPion (WELLBUTRIN) 100 mg tablet; Take 2 tablets po qam and one tablet po every evening., Normal, Disp-90 Tablet, R-2  2. Attention deficit hyperactivity disorder (ADHD), combined type  Comments:  Starting straterra, risks and potential SE discussed. Follow up in one month. Orders:  -     atomoxetine (STRATTERA) 40 mg capsule; Take 1 Capsule by mouth daily. , Normal, Disp-30 Capsule, R-0    Return in about 1 month (around 2023) for ADHD tx and Depression. SUBJECTIVE/OBJECTIVE:  Jimmy Camarillo is a 27 y.o. female who has a history of PMDD, HTN, Obesity, Anemia. PMDD: Doing well currently on Wellbutrin 200mg QAM and 100mg QHS. Denies HI/SI. Patient initially was on zoloft but did not notice improvement in mood so we switched her to wellbutrin 100mg BID which we then switched to wellbutrin 150mg XL because she had some increased agitation and we wanted to see if XL helped better. Due to persistent anxiety we then increased dose to 300mg XL daily. Patient then thought SR worked better so we swtiched to her current regimen of Wellbutrin 200mg QAM and 100mg QHS. Patient reports she still has poor concentration and desires something for her ADHD. She had formal testing with [facility] Will Aug 2022, which confirmed ADHD. Review of Systems   Cardiovascular:  Negative for chest pain and palpitations. Psychiatric/Behavioral:  Negative for dysphoric mood and hallucinations. The patient is not nervous/anxious. No data recorded     Physical Exam  Nursing note reviewed.    Constitutional: General: She is not in acute distress. Pulmonary:      Effort: Pulmonary effort is normal.   Neurological:      Mental Status: She is alert. Psychiatric:         Mood and Affect: Mood normal.         Behavior: Behavior normal.         Thought Content: Thought content normal.       Elizabet Neo, was evaluated through a synchronous (real-time) audio-video encounter. The patient (or guardian if applicable) is aware that this is a billable service, which includes applicable co-pays. This Virtual Visit was conducted with patient's (and/or legal guardian's) consent. The visit was conducted pursuant to the emergency declaration under the [address] [place] [address], [address] [place] [address] authority and the [employer] General Act. Patient identification was verified, and a caregiver was present when appropriate. The patient was located at: Home: [address] [place] [address]  The provider was located at: Home: [address] [place], [place] [address] was used to authenticate this note.   -- Haider Atkins MD

## 2023-03-08 NOTE — PROGRESS NOTES
Identified Patient with two Patient identifiers(name and ). 1. \"Have you been to the ER, urgent care clinic since your last visit? Hospitalized since your last visit? \" No    2. \"Have you seen or consulted any other health care providers outside of the [address] [place] [address] since your last visit? \"  Yes      3. For patients aged 39-70: Has the patient had a colonoscopy / FIT/ Cologuard? NA - based on age      If the patient is female:    4. For patients aged 41-77: Has the patient had a mammogram within the past 2 years? NA - based on age or sex      11. For patients aged 21-65: Has the patient had a pap smear? NA - based on age or sex     There were no vitals taken for this visit.     Chief Complaint   Patient presents with    Anxiety       Health Maintenance Due   Topic Date Due    DTaP/Tdap/Td series (1 - Tdap) Never done    Cervical cancer screen  Never done    COVID-19 Vaccine (4 - Booster for Thompson Peter series) 2022

## 2023-03-13 DIAGNOSIS — F90.2 ATTENTION DEFICIT HYPERACTIVITY DISORDER (ADHD), COMBINED TYPE: ICD-10-CM

## 2023-03-15 RX ORDER — ATOMOXETINE 40 MG/1
40 CAPSULE ORAL DAILY
Qty: 30 CAPSULE | Refills: 0 | Status: SHIPPED | OUTPATIENT
Start: 2023-03-15

## 2023-03-28 ENCOUNTER — VIRTUAL VISIT (OUTPATIENT)
Dept: PRIMARY CARE CLINIC | Age: 33
End: 2023-03-28
Payer: MEDICAID

## 2023-03-28 DIAGNOSIS — F32.81 PMDD (PREMENSTRUAL DYSPHORIC DISORDER): Primary | ICD-10-CM

## 2023-03-28 DIAGNOSIS — F90.0 ATTENTION DEFICIT HYPERACTIVITY DISORDER (ADHD), PREDOMINANTLY INATTENTIVE TYPE: ICD-10-CM

## 2023-03-28 PROCEDURE — 99214 OFFICE O/P EST MOD 30 MIN: CPT | Performed by: FAMILY MEDICINE

## 2023-03-28 NOTE — PROGRESS NOTES
Elizabet Larson (: 1990) is a 27 y.o. female, established patient, here for evaluation of the following chief complaint(s):   No chief complaint on file. ASSESSMENT/PLAN:  Below is the assessment and plan developed based on review of pertinent history, labs, studies, and medications. Patient with concomittant mood disorder and ADHD for which bupropion is recommended; however, patient did not see much executive function improvement on wellbutrin. Unfortunately, side effects were experienced once straterra added. Pt to resume previous wellbutrin regimen: 200mg QAM and 100mg QHS. Referring to psychiatry for reccs on ADHD. 1. PMDD (premenstrual dysphoric disorder)  2. Attention deficit hyperactivity disorder (ADHD), predominantly inattentive type    No follow-ups on file. SUBJECTIVE/OBJECTIVE:  Elizabet Larson is a 27 y.o. female who has a history of PMDD, HTN, Obesity, Anemia. PMDD: Patient was doing well on wellbutrin 200mg QAM and 100mgg QHS until starting straterra. She says se developed nausea and felt the meds were interacting so she has stopped the wellbutrin QAM and only takes it at night; however, now she is not sleeping well. Patient initially was on zoloft but did not notice improvement in mood so we switched her to wellbutrin 100mg BID which we then switched to wellbutrin 150mg XL because she had some increased agitation and we wanted to see if XL helped better. Due to persistent anxiety we then increased dose to 300mg XL daily. Patient then thought SR worked better so we swtiched to her current regimen of Wellbutrin 200mg QAM and 100mg QHS. ADHD: having nausea and insomnia from straterra and did not notice improvement in focus on bupropion. Review of Systems   Psychiatric/Behavioral:  Positive for decreased concentration and dysphoric mood. No data recorded     Physical Exam  Nursing note reviewed.    Constitutional:       General: She is not in acute distress. HENT:      Head: Normocephalic and atraumatic. Pulmonary:      Effort: Pulmonary effort is normal. No respiratory distress. Neurological:      Mental Status: She is alert. Psychiatric:         Mood and Affect: Mood normal.         Behavior: Behavior normal.       Alba Narvaez, was evaluated through a synchronous (real-time) audio-video encounter. The patient (or guardian if applicable) is aware that this is a billable service, which includes applicable co-pays. This Virtual Visit was conducted with patient's (and/or legal guardian's) consent. The visit was conducted pursuant to the emergency declaration under the [facility], [address] [place] [address] authority and the [employer] General Act. Patient identification was verified, and a caregiver was present when appropriate. The patient was located at: Home: [address] [place] [address]  The provider was located at: Home: [address] [place] [address] was used to authenticate this note.   -- Jessica Mitchell MD

## 2023-05-04 ENCOUNTER — OFFICE VISIT (OUTPATIENT)
Dept: PRIMARY CARE CLINIC | Age: 33
End: 2023-05-04
Payer: MEDICAID

## 2023-05-04 VITALS
SYSTOLIC BLOOD PRESSURE: 138 MMHG | WEIGHT: 288.6 LBS | OXYGEN SATURATION: 99 % | TEMPERATURE: 97.2 F | RESPIRATION RATE: 22 BRPM | HEART RATE: 60 BPM | HEIGHT: 63 IN | DIASTOLIC BLOOD PRESSURE: 88 MMHG | BODY MASS INDEX: 51.14 KG/M2

## 2023-05-04 DIAGNOSIS — F90.0 ATTENTION DEFICIT HYPERACTIVITY DISORDER (ADHD), PREDOMINANTLY INATTENTIVE TYPE: Primary | ICD-10-CM

## 2023-05-04 DIAGNOSIS — F32.81 PMDD (PREMENSTRUAL DYSPHORIC DISORDER): ICD-10-CM

## 2023-05-04 PROCEDURE — 3079F DIAST BP 80-89 MM HG: CPT | Performed by: FAMILY MEDICINE

## 2023-05-04 PROCEDURE — 3075F SYST BP GE 130 - 139MM HG: CPT | Performed by: FAMILY MEDICINE

## 2023-05-04 PROCEDURE — 99213 OFFICE O/P EST LOW 20 MIN: CPT | Performed by: FAMILY MEDICINE

## 2023-05-04 PROCEDURE — 93000 ELECTROCARDIOGRAM COMPLETE: CPT | Performed by: FAMILY MEDICINE

## 2023-05-04 RX ORDER — SERTRALINE HYDROCHLORIDE 50 MG/1
TABLET, FILM COATED ORAL
COMMUNITY
Start: 2023-03-30

## 2023-05-04 RX ORDER — CLONIDINE 0.1 MG/24H
PATCH, EXTENDED RELEASE TRANSDERMAL
COMMUNITY
Start: 2023-04-14 | End: 2023-05-04 | Stop reason: ALTCHOICE

## 2023-05-04 RX ORDER — CLONIDINE HYDROCHLORIDE 0.1 MG/1
0.1 TABLET ORAL 2 TIMES DAILY
Qty: 60 TABLET | Refills: 0
Start: 2023-05-04

## 2023-05-18 RX ORDER — AMLODIPINE BESYLATE 10 MG/1
10 TABLET ORAL DAILY
Qty: 30 TABLET | Refills: 5 | Status: SHIPPED | OUTPATIENT
Start: 2023-05-18

## 2023-06-20 RX ORDER — VALSARTAN 80 MG/1
80 TABLET ORAL DAILY
Qty: 90 TABLET | Refills: 0 | Status: SHIPPED | OUTPATIENT
Start: 2023-06-20

## 2023-09-22 RX ORDER — NORGESTIMATE AND ETHINYL ESTRADIOL 0.25-0.035
1 KIT ORAL DAILY
Qty: 84 TABLET | Refills: 0 | Status: SHIPPED | OUTPATIENT
Start: 2023-09-22

## 2023-11-08 RX ORDER — NORGESTIMATE AND ETHINYL ESTRADIOL 0.25-0.035
1 KIT ORAL DAILY
Qty: 84 TABLET | Refills: 0 | OUTPATIENT
Start: 2023-11-08

## 2023-11-28 RX ORDER — NORGESTIMATE AND ETHINYL ESTRADIOL 0.25-0.035
1 KIT ORAL DAILY
Qty: 84 TABLET | Refills: 0 | OUTPATIENT
Start: 2023-11-28

## 2023-12-12 RX ORDER — NORGESTIMATE AND ETHINYL ESTRADIOL 0.25-0.035
1 KIT ORAL DAILY
Qty: 84 TABLET | Refills: 0 | OUTPATIENT
Start: 2023-12-12

## 2023-12-19 RX ORDER — NORGESTIMATE AND ETHINYL ESTRADIOL 0.25-0.035
1 KIT ORAL DAILY
Qty: 84 TABLET | Refills: 0 | OUTPATIENT
Start: 2023-12-19

## 2024-01-03 RX ORDER — NORGESTIMATE AND ETHINYL ESTRADIOL 0.25-0.035
1 KIT ORAL DAILY
Qty: 84 TABLET | Refills: 0 | OUTPATIENT
Start: 2024-01-03

## 2024-02-14 ENCOUNTER — TELEPHONE (OUTPATIENT)
Age: 34
End: 2024-02-14

## 2024-02-22 NOTE — PROGRESS NOTES
[address]   [place] [address]   Tel.  [phone]   Fax. [phone]  [address]   [place] [address]   Tel.  [phone]   Fax. [phone] [address] [place] [address]   Tel.  [phone]   Fax. [phone]     Josselin Bergeron (: 1990) is a 32 y.o. female, established patient, seen for positive airway pressure follow-up and evaluation. She was last seen by me on 2023, previously seen by Dr. Winters on 2022, prior notes reviewed in detail. Home sleep test  showed AHI of 13.6/hr with a lowest SaO2 of 80%, duration of SaO2 < 88% 2.4 min. She is seen today for follow up.     ASSESSMENT/PLAN:   Diagnosis Orders   1. Obstructive sleep apnea (adult) (pediatric)  DME Order for (Specify) as OP      2. Essential (primary) hypertension        3. BMI 50.0-59.9, adult (HCC)          AHI = 13.6 (). On APAP : 6-15 cmH2O. Set up .     She is adherent with PAP therapy and PAP continues to benefit patient and remains necessary for control of her sleep apnea.     No follow-up provider specified.    Sleep Apnea -  Continue on current pressures. She would like to try the nasal pillows mask.     Orders Placed This Encounter   Procedures    DME Order for (Specify) as OP     Diagnosis: (G47.33) CHANO (obstructive sleep apnea)  (primary encounter diagnosis)     Replacement Supplies for Positive Airway Pressure Therapy Device:   Duration of need: 99 months.      Nasal Pillows Combo Mask (Replace) 2 per month.   Nasal Pillows (Replace) 2 per month.        Headgear 1 every 6 months.   Positive Airway Pressure chinstrap 1 every 6 months.     Tubing with heating element 1 every 3 months.     Filter(s) Disposable 2 per month.   Filter(s) Non-Disposable 1 every 6 months.   .    Water Chamber for Humidifier (Replace) 1 every 6 months.    [facility] NPI: [id number]    Electronically signed. Date:-

## 2024-02-23 ENCOUNTER — TELEMEDICINE (OUTPATIENT)
Age: 34
End: 2024-02-23
Payer: MEDICAID

## 2024-02-23 ENCOUNTER — CLINICAL DOCUMENTATION (OUTPATIENT)
Age: 34
End: 2024-02-23

## 2024-02-23 ENCOUNTER — TELEPHONE (OUTPATIENT)
Age: 34
End: 2024-02-23

## 2024-02-23 DIAGNOSIS — I10 ESSENTIAL (PRIMARY) HYPERTENSION: ICD-10-CM

## 2024-02-23 DIAGNOSIS — G47.33 OBSTRUCTIVE SLEEP APNEA (ADULT) (PEDIATRIC): Primary | ICD-10-CM

## 2024-02-23 PROCEDURE — 99213 OFFICE O/P EST LOW 20 MIN: CPT | Performed by: NURSE PRACTITIONER

## 2024-02-23 RX ORDER — FLUOXETINE 10 MG/1
10 CAPSULE ORAL DAILY
COMMUNITY
Start: 2024-02-07

## 2024-02-23 RX ORDER — DEXTROAMPHETAMINE SACCHARATE, AMPHETAMINE ASPARTATE, DEXTROAMPHETAMINE SULFATE AND AMPHETAMINE SULFATE 3.75; 3.75; 3.75; 3.75 MG/1; MG/1; MG/1; MG/1
15 TABLET ORAL DAILY
COMMUNITY
Start: 2024-02-07

## 2024-02-23 RX ORDER — FLUOXETINE HYDROCHLORIDE 20 MG/1
20 CAPSULE ORAL DAILY
COMMUNITY
Start: 2024-02-09

## 2024-02-23 ASSESSMENT — SLEEP AND FATIGUE QUESTIONNAIRES
HOW LIKELY ARE YOU TO NOD OFF OR FALL ASLEEP WHILE LYING DOWN TO REST IN THE AFTERNOON WHEN CIRCUMSTANCES PERMIT: MODERATE CHANCE OF DOZING
DO YOU GENERALLY HAVE DIFFICULTY REMEMBERING THINGS BECAUSE YOU ARE SLEEPY OR TIRED: YES, A LITTLE
DO YOU HAVE DIFFICULTY VISITING YOUR FAMILY OR FRIENDS IN THEIR HOME BECAUSE YOU BECOME SLEEPY OR TIRED: NO
HOW LIKELY ARE YOU TO NOD OFF OR FALL ASLEEP WHILE SITTING QUIETLY AFTER LUNCH WITHOUT ALCOHOL: WOULD NEVER DOZE
HOW LIKELY ARE YOU TO NOD OFF OR FALL ASLEEP WHILE SITTING AND TALKING TO SOMEONE: WOULD NEVER DOZE
DO YOU HAVE DIFFICULTY OPERATING A MOTOR VEHICLE FOR SHORT DISTANCES (LESS THAN 100 MILES) BECAUSE YOU BECOME SLEEPY: NO
HOW LIKELY ARE YOU TO NOD OFF OR FALL ASLEEP WHILE SITTING AND READING: SLIGHT CHANCE OF DOZING
HOW LIKELY ARE YOU TO NOD OFF OR FALL ASLEEP WHILE SITTING AND READING: 1
HOW LIKELY ARE YOU TO NOD OFF OR FALL ASLEEP WHEN YOU ARE A PASSENGER IN A CAR FOR AN HOUR WITHOUT A BREAK: 0
HOW LIKELY ARE YOU TO NOD OFF OR FALL ASLEEP WHILE WATCHING TV: 2
DO YOU HAVE DIFFICULTY OPERATING A MOTOR VEHICLE FOR LONG DISTANCES (GREATER THAN 100 MILES) BECAUSE YOU BECOME SLEEPY: NO
HOW LIKELY ARE YOU TO NOD OFF OR FALL ASLEEP WHILE SITTING QUIETLY AFTER LUNCH WITHOUT ALCOHOL: 0
DO YOU HAVE DIFFICULTY WATCHING A MOVIE OR VIDEO BECAUSE YOU BECOME SLEEPY OR TIRED: YES, A LITTLE
HOW LIKELY ARE YOU TO NOD OFF OR FALL ASLEEP WHILE SITTING INACTIVE IN A PUBLIC PLACE: 0
HOW LIKELY ARE YOU TO NOD OFF OR FALL ASLEEP WHEN YOU ARE A PASSENGER IN A CAR FOR AN HOUR WITHOUT A BREAK: WOULD NEVER DOZE
DO YOU HAVE DIFFICULTY BEING AS ACTIVE AS YOU WANT TO BE IN THE MORNING BECAUSE YOU ARE SLEEPY OR TIRED: YES, EXTREME
HOW LIKELY ARE YOU TO NOD OFF OR FALL ASLEEP IN A CAR, WHILE STOPPED FOR A FEW MINUTES IN TRAFFIC: SLIGHT CHANCE OF DOZING
ESS TOTAL SCORE: 6
HOW LIKELY ARE YOU TO NOD OFF OR FALL ASLEEP WHILE SITTING INACTIVE IN A PUBLIC PLACE: WOULD NEVER DOZE
HAS YOUR RELATIONSHIP WITH FAMILY, FRIENDS OR WORK COLLEAGUES BEEN AFFECTED BECAUSE YOU ARE SLEEPY OR TIRED: YES, A LITTLE
HAS YOUR MOOD BEEN AFFECTED BECAUSE YOU ARE SLEEPY OR TIRED: YES, MODERATE
DO YOU HAVE DIFFICULTY BEING AS ACTIVE AS YOU WANT TO BE IN THE EVENING BECAUSE YOU ARE SLEEPY OR TIRED: YES, MODERATE
HOW LIKELY ARE YOU TO NOD OFF OR FALL ASLEEP IN A CAR, WHILE STOPPED FOR A FEW MINUTES IN TRAFFIC: 1
FOSQ SCORE: 14
HOW LIKELY ARE YOU TO NOD OFF OR FALL ASLEEP WHILE SITTING AND TALKING TO SOMEONE: 0
DO YOU HAVE DIFFICULTY CONCENTRATING ON THE THINGS YOU DO BECAUSE YOU ARE SLEEPY OR TIRED: YES, MODERATE
HOW LIKELY ARE YOU TO NOD OFF OR FALL ASLEEP WHILE LYING DOWN TO REST IN THE AFTERNOON WHEN CIRCUMSTANCES PERMIT: 2
HOW LIKELY ARE YOU TO NOD OFF OR FALL ASLEEP WHILE WATCHING TV: MODERATE CHANCE OF DOZING

## 2024-02-23 NOTE — PATIENT INSTRUCTIONS
drowsiness. Medications that impair a drivers attention should have a warning label. Alcohol naturally makes you sleepy and on its own can cause accidents. Combined with excessive drowsiness its effects are amplified.   Signs of Drowsy Driving:   * You don't remember driving the last few miles   * You may drift out of your mo   * You are unable to focus and your thoughts wander   * You may yawn more often than normal   * You have difficulty keeping your eyes open / nodding off   * Missing traffic signs, speeding, or tailgating  Prevention-   Good sleep hygiene, lifestyle and behavioral choices have the most impact on drowsy driving. There is no substitute for sleep and the average person requires 8 hours nightly. If you find yourself driving drowsy, stop and sleep. Consider the sleep hygiene tips provided during your visit as well.     Medication Refill Policy: Refills for all medications require 1 week advance notice. Please have your pharmacy fax a refill request. We are unable to fax, or call in \"controled substance\" medications and you will need to pick these prescriptions up from our office.

## 2025-02-27 ASSESSMENT — SLEEP AND FATIGUE QUESTIONNAIRES
DO YOU HAVE DIFFICULTY VISITING YOUR FAMILY OR FRIENDS IN THEIR HOME BECAUSE YOU BECOME SLEEPY OR TIRED: NO
HOW LIKELY ARE YOU TO NOD OFF OR FALL ASLEEP WHILE WATCHING TV: MODERATE CHANCE OF DOZING
HOW LIKELY ARE YOU TO NOD OFF OR FALL ASLEEP IN A CAR, WHILE STOPPED FOR A FEW MINUTES IN TRAFFIC: WOULD NEVER DOZE
HOW LIKELY ARE YOU TO NOD OFF OR FALL ASLEEP WHILE SITTING QUIETLY AFTER LUNCH WITHOUT ALCOHOL: SLIGHT CHANCE OF DOZING
DO YOU HAVE DIFFICULTY CONCENTRATING ON THE THINGS YOU DO BECAUSE YOU ARE SLEEPY OR TIRED: YES, MODERATE
HOW LIKELY ARE YOU TO NOD OFF OR FALL ASLEEP WHEN YOU ARE A PASSENGER IN A CAR FOR AN HOUR WITHOUT A BREAK: WOULD NEVER DOZE
HOW LIKELY ARE YOU TO NOD OFF OR FALL ASLEEP WHILE WATCHING TV: MODERATE CHANCE OF DOZING
HOW LIKELY ARE YOU TO NOD OFF OR FALL ASLEEP WHILE LYING DOWN TO REST IN THE AFTERNOON WHEN CIRCUMSTANCES PERMIT: MODERATE CHANCE OF DOZING
HAS YOUR MOOD BEEN AFFECTED BECAUSE YOU ARE SLEEPY OR TIRED: YES, LITTLE
HOW LIKELY ARE YOU TO NOD OFF OR FALL ASLEEP IN A CAR, WHILE STOPPED FOR A FEW MINUTES IN TRAFFIC: WOULD NEVER DOZE
HAS YOUR RELATIONSHIP WITH FAMILY, FRIENDS OR WORK COLLEAGUES BEEN AFFECTED BECAUSE YOU ARE SLEEPY OR TIRED: YES, MODERATE
DO YOU HAVE DIFFICULTY BEING AS ACTIVE AS YOU WANT TO BE IN THE EVENING BECAUSE YOU ARE SLEEPY OR TIRED: YES, MODERATE
DO YOU HAVE DIFFICULTY OPERATING A MOTOR VEHICLE FOR SHORT DISTANCES (LESS THAN 100 MILES) BECAUSE YOU BECOME SLEEPY: NO
DO YOU GENERALLY HAVE DIFFICULTY REMEMBERING THINGS BECAUSE YOU ARE SLEEPY OR TIRED: YES, A LITTLE
HOW LIKELY ARE YOU TO NOD OFF OR FALL ASLEEP WHILE SITTING INACTIVE IN A PUBLIC PLACE: WOULD NEVER DOZE
FOSQ SCORE: 14.5
HOW LIKELY ARE YOU TO NOD OFF OR FALL ASLEEP WHILE SITTING AND READING: WOULD NEVER DOZE
HOW LIKELY ARE YOU TO NOD OFF OR FALL ASLEEP WHILE SITTING INACTIVE IN A PUBLIC PLACE: WOULD NEVER DOZE
DO YOU HAVE DIFFICULTY OPERATING A MOTOR VEHICLE FOR LONG DISTANCES (GREATER THAN 100 MILES) BECAUSE YOU BECOME SLEEPY: NO
ESS TOTAL SCORE: 5
HOW LIKELY ARE YOU TO NOD OFF OR FALL ASLEEP WHEN YOU ARE A PASSENGER IN A CAR FOR AN HOUR WITHOUT A BREAK: WOULD NEVER DOZE
DO YOU HAVE DIFFICULTY WATCHING A MOVIE OR VIDEO BECAUSE YOU BECOME SLEEPY OR TIRED: YES, A LITTLE
HOW LIKELY ARE YOU TO NOD OFF OR FALL ASLEEP WHILE SITTING AND TALKING TO SOMEONE: WOULD NEVER DOZE
HOW LIKELY ARE YOU TO NOD OFF OR FALL ASLEEP WHILE SITTING QUIETLY AFTER LUNCH WITHOUT ALCOHOL: SLIGHT CHANCE OF DOZING
HOW LIKELY ARE YOU TO NOD OFF OR FALL ASLEEP WHILE SITTING AND TALKING TO SOMEONE: WOULD NEVER DOZE
DO YOU HAVE DIFFICULTY BEING AS ACTIVE AS YOU WANT TO BE IN THE MORNING BECAUSE YOU ARE SLEEPY OR TIRED: YES, MODERATE
HOW LIKELY ARE YOU TO NOD OFF OR FALL ASLEEP WHILE LYING DOWN TO REST IN THE AFTERNOON WHEN CIRCUMSTANCES PERMIT: MODERATE CHANCE OF DOZING
HOW LIKELY ARE YOU TO NOD OFF OR FALL ASLEEP WHILE SITTING AND READING: WOULD NEVER DOZE

## 2025-02-28 ENCOUNTER — TELEPHONE (OUTPATIENT)
Age: 35
End: 2025-02-28

## 2025-02-28 ENCOUNTER — TELEMEDICINE (OUTPATIENT)
Age: 35
End: 2025-02-28
Payer: COMMERCIAL

## 2025-02-28 DIAGNOSIS — G47.33 OBSTRUCTIVE SLEEP APNEA (ADULT) (PEDIATRIC): Primary | ICD-10-CM

## 2025-02-28 DIAGNOSIS — I10 ESSENTIAL (PRIMARY) HYPERTENSION: ICD-10-CM

## 2025-02-28 PROCEDURE — 99213 OFFICE O/P EST LOW 20 MIN: CPT | Performed by: NURSE PRACTITIONER

## 2025-02-28 NOTE — TELEPHONE ENCOUNTER
Called patient left a voice message to pre-register for Telemed visit, patient has completed the mychart questionnaires and link will be sent to the mobile number( text)  for the virtual appointment.

## 2025-02-28 NOTE — PROGRESS NOTES
[address]   [place] [address]   Tel.  [phone]   Fax. [phone]  [address]   [place] [address]   Tel.  [phone]   Fax. [phone] [address] [place] [address]   Tel.  [phone]   Fax. [phone]     Josselin Bergeron (: 1990) is a 32 y.o. female, established patient, seen for positive airway pressure follow-up and evaluation. She was last seen by me on 2024, previously seen by Dr. Winters on 2022, prior notes reviewed in detail. Home sleep test  showed AHI of 13.6/hr with a lowest SaO2 of 80%, duration of SaO2 < 88% 2.4 min. She is seen today for follow up.     ASSESSMENT/PLAN:   Diagnosis Orders   1. Obstructive sleep apnea (adult) (pediatric)  DME Order for (Specify) as OP      2. Essential (primary) hypertension        3. BMI 50.0-59.9, adult          AHI = 13.6 (). On APAP : 6-15 cmH2O. Set up .     She is adherent with PAP therapy and PAP continues to benefit patient and remains necessary for control of her sleep apnea.     No follow-up provider specified.    Sleep Apnea -  She notes she has been using her machine though we cannot see the data. She will log into the 3B adam and try to transmit the data remotely.     Orders Placed This Encounter   Procedures    DME Order for (Specify) as OP     Diagnosis: (G47.33) CHANO (obstructive sleep apnea)  (primary encounter diagnosis)     Replacement Supplies for Positive Airway Pressure Therapy Device:   Duration of need: 99 months.        Nasal Cushion (Replace) 2 per month.   Nasal Interface Mask 1 every 3 months.       Headgear 1 every 6 months.   Positive Airway Pressure chinstrap 1 every 6 months.     Tubing with heating element 1 every 3 months.     Filter(s) Disposable 2 per month.   Filter(s) Non-Disposable 1 every 6 months.   .    Water Chamber for Humidifier (Replace) 1 every 6 months.    Rosario Cochran P-BC NPI:

## 2025-02-28 NOTE — PATIENT INSTRUCTIONS
[address]  [place] [address]  Tel.  [phone]  Fax. [phone] [address]  [place] [address]  Tel.  [phone]  Fax. [phone] [address] [place] [address]  Tel.  [phone]  Fax. [phone]     Learning About CPAP for Sleep Apnea  What is CPAP?              CPAP is a small machine that you use at home every night while you sleep. It increases air pressure in your throat to keep your airway open. When you have sleep apnea, this can help you sleep better so you feel much better. CPAP stands for \"continuous positive airway pressure.\"  The CPAP machine will have one of the following:  A mask that covers your nose and mouth  Prongs that fit into your nose  A mask that covers your nose only, the most common type. This type is called NCPAP. The N stands for \"nasal.\"  Why is it done?  CPAP is usually the best treatment for obstructive sleep apnea. It is the first treatment choice and the most widely used. Your doctor may suggest CPAP if you have:  Moderate to severe sleep apnea.  Sleep apnea and coronary artery disease (CAD) or heart failure.  How does it help?  CPAP can help you have more normal sleep, so you feel less sleepy and more alert during the daytime.  CPAP may help keep heart failure or other heart problems from getting worse.  NCPAP may help lower your blood pressure.  If you use CPAP, your bed partner may also sleep better because you are not snoring or restless.  What are the side effects?  Some people who use CPAP have:  A dry or stuffy nose and a sore throat.  Irritated skin on the face.  Sore eyes.  Bloating.  If you have any of these problems, work with your doctor to fix them. Here are some things you can try:  Be sure the mask or nasal prongs fit well.  See if your doctor can adjust the pressure of your CPAP.  If your nose is dry, try a humidifier.  If your nose is runny or stuffy, try decongestant medicine or a steroid

## 2025-03-05 ENCOUNTER — CLINICAL DOCUMENTATION (OUTPATIENT)
Age: 35
End: 2025-03-05